# Patient Record
Sex: MALE | Race: WHITE | NOT HISPANIC OR LATINO | Employment: OTHER | RURAL
[De-identification: names, ages, dates, MRNs, and addresses within clinical notes are randomized per-mention and may not be internally consistent; named-entity substitution may affect disease eponyms.]

---

## 2017-02-21 ENCOUNTER — OFFICE VISIT (OUTPATIENT)
Dept: RETAIL CLINIC | Facility: CLINIC | Age: 52
End: 2017-02-21

## 2017-02-21 VITALS
OXYGEN SATURATION: 96 % | WEIGHT: 209.4 LBS | RESPIRATION RATE: 18 BRPM | HEIGHT: 68 IN | HEART RATE: 86 BPM | BODY MASS INDEX: 31.74 KG/M2 | TEMPERATURE: 99 F

## 2017-02-21 DIAGNOSIS — R06.2 WHEEZE: Primary | ICD-10-CM

## 2017-02-21 PROCEDURE — 99213 OFFICE O/P EST LOW 20 MIN: CPT | Performed by: NURSE PRACTITIONER

## 2017-02-21 RX ORDER — NAPROXEN SODIUM 220 MG
220 TABLET ORAL 2 TIMES DAILY PRN
COMMUNITY

## 2017-02-21 RX ORDER — METHYLPREDNISOLONE ACETATE 80 MG/ML
80 INJECTION, SUSPENSION INTRA-ARTICULAR; INTRALESIONAL; INTRAMUSCULAR; SOFT TISSUE ONCE
Status: COMPLETED | OUTPATIENT
Start: 2017-02-21 | End: 2017-02-21

## 2017-02-21 RX ORDER — ALBUTEROL SULFATE 90 UG/1
2 AEROSOL, METERED RESPIRATORY (INHALATION) EVERY 4 HOURS PRN
Qty: 1 INHALER | Refills: 0 | Status: SHIPPED | OUTPATIENT
Start: 2017-02-21

## 2017-02-21 RX ADMIN — METHYLPREDNISOLONE ACETATE 80 MG: 80 INJECTION, SUSPENSION INTRA-ARTICULAR; INTRALESIONAL; INTRAMUSCULAR; SOFT TISSUE at 09:18

## 2017-02-21 NOTE — PROGRESS NOTES
"Stefan Bush is a 51 y.o. male.   Visit Vitals   • Pulse 86   • Temp 99 °F (37.2 °C) (Oral)   • Resp 18   • Ht 68\" (172.7 cm)   • Wt 209 lb 6.4 oz (95 kg)   • SpO2 96%   • BMI 31.84 kg/m2     Pt states that he has had flu like symptoms last week with fever of 102, body aches, cough, nasal congestion and general fatigue. States he is left with a lingering cough, SOB and wheeze. He is a  and has been off work since last week.     Cough   This is a new problem. The current episode started 1 to 4 weeks ago. The problem has been gradually worsening. The problem occurs every few minutes. Associated symptoms include a fever, myalgias (resolved), rhinorrhea and wheezing. Sore throat: resolved.        The following portions of the patient's history were reviewed and updated as appropriate: allergies, current medications, past family history, past medical history, past social history, past surgical history and problem list.    Review of Systems   Constitutional: Positive for diaphoresis, fatigue (resolved) and fever.   HENT: Positive for congestion and rhinorrhea. Sore throat: resolved.    Respiratory: Positive for cough, chest tightness and wheezing.    Cardiovascular: Negative for palpitations and leg swelling.   Gastrointestinal: Negative for diarrhea, nausea and vomiting.   Musculoskeletal: Positive for arthralgias (resolved) and myalgias (resolved).       Objective   Physical Exam   Constitutional: He appears well-developed and well-nourished. He has a sickly appearance.   HENT:   Head: Normocephalic and atraumatic.   Right Ear: Tympanic membrane and ear canal normal.   Left Ear: Tympanic membrane and ear canal normal.   Nose: Mucosal edema and rhinorrhea present. Right sinus exhibits no maxillary sinus tenderness and no frontal sinus tenderness. Left sinus exhibits no maxillary sinus tenderness and no frontal sinus tenderness.   Mouth/Throat: Posterior oropharyngeal erythema (slight) " present.   Cardiovascular: Regular rhythm and normal heart sounds.    Pulmonary/Chest: Effort normal. He has wheezes (moderate to severe). He has rhonchi. He has rales (trace).   Lymphadenopathy:     He has no cervical adenopathy.   Skin: Skin is warm and dry.       Assessment/Plan   Isai was seen today for cough.    Diagnoses and all orders for this visit:    Wheeze  -     methylPREDNISolone acetate (DEPO-medrol) injection 80 mg; Inject 1 mL into the shoulder, thigh, or buttocks 1 (One) Time.    Other orders  -     albuterol (PROVENTIL HFA;VENTOLIN HFA) 108 (90 BASE) MCG/ACT inhaler; Inhale 2 puffs Every 4 (Four) Hours As Needed for wheezing or shortness of air.      Pt has been recommend that you he does require a chest x-ray to rule out pneumonia due to influenza.  He should go to his [facility] care physician today or no later than tomorrow or ER if necessary if symptoms worsen. Pt states understanding.

## 2022-09-21 RX ORDER — LOSARTAN POTASSIUM AND HYDROCHLOROTHIAZIDE 25; 100 MG/1; MG/1
TABLET ORAL
Qty: 30 TABLET | Refills: 0 | Status: SHIPPED | OUTPATIENT
Start: 2022-09-21 | End: 2022-10-18

## 2022-10-18 RX ORDER — LOSARTAN POTASSIUM AND HYDROCHLOROTHIAZIDE 25; 100 MG/1; MG/1
TABLET ORAL
Qty: 30 TABLET | Refills: 0 | Status: SHIPPED | OUTPATIENT
Start: 2022-10-18 | End: 2022-11-15

## 2022-11-15 RX ORDER — LOSARTAN POTASSIUM AND HYDROCHLOROTHIAZIDE 25; 100 MG/1; MG/1
TABLET ORAL
Qty: 30 TABLET | Refills: 0 | Status: SHIPPED | OUTPATIENT
Start: 2022-11-15 | End: 2022-12-20 | Stop reason: SDUPTHER

## 2022-12-20 RX ORDER — LOSARTAN POTASSIUM AND HYDROCHLOROTHIAZIDE 25; 100 MG/1; MG/1
TABLET ORAL
Qty: 30 TABLET | Refills: 0 | OUTPATIENT
Start: 2022-12-20

## 2022-12-20 RX ORDER — LOSARTAN POTASSIUM AND HYDROCHLOROTHIAZIDE 25; 100 MG/1; MG/1
1 TABLET ORAL DAILY
Qty: 30 TABLET | Refills: 0 | Status: SHIPPED | OUTPATIENT
Start: 2022-12-20 | End: 2023-02-20

## 2022-12-20 NOTE — TELEPHONE ENCOUNTER
Fine to refill that medicine for him for 1 month, but he would need to be called to set up an appointment to get any further refills.  Thanks.

## 2022-12-20 NOTE — TELEPHONE ENCOUNTER
Called and spoke with the patient and advised him that I would refill for 1 month and he stated he would call and schedule an appointment      rx sent

## 2023-02-06 DIAGNOSIS — R79.89 LOW TESTOSTERONE IN MALE: Primary | ICD-10-CM

## 2023-02-07 RX ORDER — TESTOSTERONE 16.2 MG/G
GEL TRANSDERMAL
Qty: 150 G | Refills: 0 | Status: SHIPPED | OUTPATIENT
Start: 2023-02-07

## 2023-02-17 ENCOUNTER — OFFICE VISIT (OUTPATIENT)
Dept: FAMILY MEDICINE CLINIC | Facility: CLINIC | Age: 58
End: 2023-02-17
Payer: COMMERCIAL

## 2023-02-17 VITALS
SYSTOLIC BLOOD PRESSURE: 118 MMHG | HEART RATE: 84 BPM | BODY MASS INDEX: 35.96 KG/M2 | HEIGHT: 68 IN | DIASTOLIC BLOOD PRESSURE: 70 MMHG | TEMPERATURE: 98.6 F | WEIGHT: 237.25 LBS | OXYGEN SATURATION: 98 % | RESPIRATION RATE: 14 BRPM

## 2023-02-17 DIAGNOSIS — J30.1 SEASONAL ALLERGIC RHINITIS DUE TO POLLEN: ICD-10-CM

## 2023-02-17 DIAGNOSIS — Z00.00 ROUTINE GENERAL MEDICAL EXAMINATION AT A HEALTH CARE FACILITY: ICD-10-CM

## 2023-02-17 DIAGNOSIS — E66.09 CLASS 2 OBESITY DUE TO EXCESS CALORIES WITHOUT SERIOUS COMORBIDITY WITH BODY MASS INDEX (BMI) OF 36.0 TO 36.9 IN ADULT: ICD-10-CM

## 2023-02-17 DIAGNOSIS — E78.2 MIXED HYPERLIPIDEMIA: ICD-10-CM

## 2023-02-17 DIAGNOSIS — Z23 NEED FOR VACCINATION: ICD-10-CM

## 2023-02-17 DIAGNOSIS — N52.9 VASCULOGENIC ERECTILE DYSFUNCTION, UNSPECIFIED VASCULOGENIC ERECTILE DYSFUNCTION TYPE: ICD-10-CM

## 2023-02-17 DIAGNOSIS — Z12.5 SPECIAL SCREENING, PROSTATE CANCER: ICD-10-CM

## 2023-02-17 DIAGNOSIS — R73.03 PREDIABETES: ICD-10-CM

## 2023-02-17 DIAGNOSIS — F17.210 CIGARETTE SMOKER: ICD-10-CM

## 2023-02-17 DIAGNOSIS — R79.89 LOW TESTOSTERONE IN MALE: ICD-10-CM

## 2023-02-17 DIAGNOSIS — I10 ESSENTIAL HYPERTENSION: Primary | ICD-10-CM

## 2023-02-17 DIAGNOSIS — R80.8 OTHER PROTEINURIA: ICD-10-CM

## 2023-02-17 PROBLEM — E66.812 CLASS 2 OBESITY DUE TO EXCESS CALORIES WITHOUT SERIOUS COMORBIDITY WITH BODY MASS INDEX (BMI) OF 36.0 TO 36.9 IN ADULT: Status: ACTIVE | Noted: 2023-02-17

## 2023-02-17 PROCEDURE — 90471 IMMUNIZATION ADMIN: CPT | Performed by: INTERNAL MEDICINE

## 2023-02-17 PROCEDURE — 99214 OFFICE O/P EST MOD 30 MIN: CPT | Performed by: INTERNAL MEDICINE

## 2023-02-17 PROCEDURE — 90715 TDAP VACCINE 7 YRS/> IM: CPT | Performed by: INTERNAL MEDICINE

## 2023-02-17 NOTE — ASSESSMENT & PLAN NOTE
Hemoglobin A1c upper limit of normal at 5.7% on 6/17/2022, with recheck pending with blood work today.  Reinforced importance of healthy diet, exercise and weight loss.

## 2023-02-17 NOTE — ASSESSMENT & PLAN NOTE
BMI in the mid 30s range, recommend healthy diet, exercise and benefits of even modest weight loss for multiple associated comorbidities

## 2023-02-17 NOTE — ASSESSMENT & PLAN NOTE
Diagnosis 3/9/2021.  EKG  3/9/2021 with sinus rhythm, with isolated and non-concerning right bundle branch block.  Continue losartan/HCTZ 100/25 mg, increase dosing 3/11/2022.  Monitor blood pressure regularly.  Continue healthy diet, exercise, weight loss, decrease salt intake, et cetera.  Advise concerns.

## 2023-02-17 NOTE — ASSESSMENT & PLAN NOTE
Felt in part related to low testosterone level as discussed initially 3/11/2022.  Clinically improved with replacement, if recurrent or persistent in the future we could consider adding sildenafil or comparable medication.

## 2023-02-17 NOTE — ASSESSMENT & PLAN NOTE
Last screening blood work 7/23/2021, fasting blood work ordered today 2/17/2023.  Tdap booster given 2/17/2023, patient declines pneumococcal 20 valent vaccine, despite discussion of benefits colonoscopy 4/14/2021 by Dr. De Leon including multiple small diverticula, internal hemorrhoids noted.  Multiple small hyperplastic appearing polyps in the rectum, in the transverse colon base less than 5 mm sessile polyp, transverse colon a 5 mm sessile polyp with no high-grade dysplasia on biopsy.  Five-year followup recommended.
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
## 2023-02-17 NOTE — ASSESSMENT & PLAN NOTE
Most recent total testosterone 6/25/2022 surprisingly slightly increased to 229, comparison 3/26/2022 243, and 3/19/2022 at 248.  At that time we increased his testosterone/AndroGel 1.62% from 2 pounds that each yielding 20.25 mg of testosterone from 2 pumps on the shoulder once daily up to 4 pumps on the shoulder, and since that time he has done well and feels his symptoms of functionally resolved and improved.  Additional testing on 6/25/2022 related to testosterone administration includes CBC with differential within normal limits, CMP with notable normal liver function test, although creatinine was a little bit increased 1.5, compared to normal previous 1.3.  Also, PSA normal 0.30 with comparison 0.2 on 3/19/2022.  These are reassuring for monitoring.  Recheck total testosterone and PSA at this time in addition to other screening blood work with management per results.

## 2023-02-17 NOTE — ASSESSMENT & PLAN NOTE
1+ proteinuria with 7/23/2021, blood work, cleared on recheck urinalysis today in clinic 3/11/2022.

## 2023-02-17 NOTE — ASSESSMENT & PLAN NOTE
7/23/2021 total cholesterol 198, triglycerides 226, HDL 28, .  Diet controlled.  Modest dyslipidemia pattern, reinforced importance of healthy diet, exercise and benefits of weight loss.

## 2023-02-17 NOTE — ASSESSMENT & PLAN NOTE
Good response to as needed use of typical over-the-counter antihistamine, sometimes nasal steroid.  Currently doing well.  Additional benefit of saline spray, nasal flushing.  Advise concerns.

## 2023-02-17 NOTE — PROGRESS NOTES
Venipuncture Blood Specimen Collection  Venipuncture performed in left arm by Carley Duncan MA with good hemostasis. Patient tolerated the procedure well without complications.   02/17/23   Carley Duncan MA

## 2023-02-17 NOTE — ASSESSMENT & PLAN NOTE
One pack per day for 30 years, currently one pack per day.  He is not ready to pursue cessation but he understands risks.  Advise desire to pursue cessation with medicine.  Low-dose CT of the chest ordered 2/17/2023.

## 2023-02-18 LAB
ALBUMIN SERPL-MCNC: 4.4 G/DL (ref 3.8–4.9)
ALBUMIN/GLOB SERPL: 1.6 {RATIO} (ref 1.2–2.2)
ALP SERPL-CCNC: 112 IU/L (ref 44–121)
ALT SERPL-CCNC: 15 IU/L (ref 0–44)
APPEARANCE UR: CLEAR
AST SERPL-CCNC: 17 IU/L (ref 0–40)
BACTERIA #/AREA URNS HPF: NORMAL /[HPF]
BASOPHILS # BLD AUTO: 0 X10E3/UL (ref 0–0.2)
BASOPHILS NFR BLD AUTO: 1 %
BILIRUB SERPL-MCNC: 0.6 MG/DL (ref 0–1.2)
BILIRUB UR QL STRIP: NEGATIVE
BUN SERPL-MCNC: 19 MG/DL (ref 6–24)
BUN/CREAT SERPL: 13 (ref 9–20)
CALCIUM SERPL-MCNC: 9.1 MG/DL (ref 8.7–10.2)
CASTS URNS QL MICRO: NORMAL /LPF
CHLORIDE SERPL-SCNC: 103 MMOL/L (ref 96–106)
CHOLEST SERPL-MCNC: 204 MG/DL (ref 100–199)
CO2 SERPL-SCNC: 19 MMOL/L (ref 20–29)
COLOR UR: YELLOW
CREAT SERPL-MCNC: 1.43 MG/DL (ref 0.76–1.27)
EGFRCR SERPLBLD CKD-EPI 2021: 57 ML/MIN/1.73
EOSINOPHIL # BLD AUTO: 0.2 X10E3/UL (ref 0–0.4)
EOSINOPHIL NFR BLD AUTO: 3 %
EPI CELLS #/AREA URNS HPF: NORMAL /HPF (ref 0–10)
ERYTHROCYTE [DISTWIDTH] IN BLOOD BY AUTOMATED COUNT: 13.2 % (ref 11.6–15.4)
GLOBULIN SER CALC-MCNC: 2.8 G/DL (ref 1.5–4.5)
GLUCOSE SERPL-MCNC: 107 MG/DL (ref 70–99)
GLUCOSE UR QL STRIP: NEGATIVE
HBA1C MFR BLD: 5.9 % (ref 4.8–5.6)
HCT VFR BLD AUTO: 50.6 % (ref 37.5–51)
HCV IGG SERPL QL IA: NON REACTIVE
HDLC SERPL-MCNC: 30 MG/DL
HGB BLD-MCNC: 16.6 G/DL (ref 13–17.7)
HGB UR QL STRIP: NEGATIVE
HIV 1+2 AB+HIV1 P24 AG SERPL QL IA: NON REACTIVE
IMM GRANULOCYTES # BLD AUTO: 0 X10E3/UL (ref 0–0.1)
IMM GRANULOCYTES NFR BLD AUTO: 0 %
KETONES UR QL STRIP: NEGATIVE
LDLC SERPL CALC-MCNC: 123 MG/DL (ref 0–99)
LEUKOCYTE ESTERASE UR QL STRIP: NEGATIVE
LYMPHOCYTES # BLD AUTO: 2.4 X10E3/UL (ref 0.7–3.1)
LYMPHOCYTES NFR BLD AUTO: 37 %
MCH RBC QN AUTO: 29.3 PG (ref 26.6–33)
MCHC RBC AUTO-ENTMCNC: 32.8 G/DL (ref 31.5–35.7)
MCV RBC AUTO: 89 FL (ref 79–97)
MICRO URNS: NORMAL
MICRO URNS: NORMAL
MONOCYTES # BLD AUTO: 0.7 X10E3/UL (ref 0.1–0.9)
MONOCYTES NFR BLD AUTO: 10 %
NEUTROPHILS # BLD AUTO: 3.2 X10E3/UL (ref 1.4–7)
NEUTROPHILS NFR BLD AUTO: 49 %
NITRITE UR QL STRIP: NEGATIVE
PH UR STRIP: 6 [PH] (ref 5–7.5)
PLATELET # BLD AUTO: 272 X10E3/UL (ref 150–450)
POTASSIUM SERPL-SCNC: 4.2 MMOL/L (ref 3.5–5.2)
PROT SERPL-MCNC: 7.2 G/DL (ref 6–8.5)
PROT UR QL STRIP: NEGATIVE
PSA SERPL-MCNC: 0.4 NG/ML (ref 0–4)
RBC # BLD AUTO: 5.66 X10E6/UL (ref 4.14–5.8)
RBC #/AREA URNS HPF: NORMAL /HPF (ref 0–2)
SODIUM SERPL-SCNC: 139 MMOL/L (ref 134–144)
SP GR UR STRIP: 1.02 (ref 1–1.03)
TESTOST SERPL-MCNC: 538 NG/DL (ref 264–916)
TRIGL SERPL-MCNC: 285 MG/DL (ref 0–149)
TSH SERPL DL<=0.005 MIU/L-ACNC: 2.64 UIU/ML (ref 0.45–4.5)
URINALYSIS REFLEX: NORMAL
UROBILINOGEN UR STRIP-MCNC: 0.2 MG/DL (ref 0.2–1)
VLDLC SERPL CALC-MCNC: 51 MG/DL (ref 5–40)
WBC # BLD AUTO: 6.5 X10E3/UL (ref 3.4–10.8)
WBC #/AREA URNS HPF: NORMAL /HPF (ref 0–5)

## 2023-02-20 RX ORDER — LOSARTAN POTASSIUM AND HYDROCHLOROTHIAZIDE 25; 100 MG/1; MG/1
TABLET ORAL
Qty: 30 TABLET | Refills: 6 | Status: SHIPPED | OUTPATIENT
Start: 2023-02-20

## 2023-02-20 NOTE — TELEPHONE ENCOUNTER
Caller: BIGG  GIRLFRIENJC    Best call back number   [phone]    Requested Prescriptions:   Requested Prescriptions     Pending Prescriptions Disp Refills   • losartan-hydrochlorothiazide (HYZAAR) 100-25 MG per tablet [Pharmacy Med Name: LOSARTAN-HCTZ 100-25 MG TAB] 30 tablet 0     Sig: TAKE ONE TABLET BY MOUTH ONCE A DAY        Pharmacy where request should be sent: [facility] [address] - [phone]  - [phone] FX     Additional details provided by patient:     Does the patient have less than a 3 day supply:  [x] Yes  [] No    Would you like a call back once the refill request has been completed: [] Yes [x] No    If the office needs to give you a call back, can they leave a voicemail: [] Yes [x] No

## 2023-02-21 ENCOUNTER — TELEPHONE (OUTPATIENT)
Dept: FAMILY MEDICINE CLINIC | Facility: CLINIC | Age: 58
End: 2023-02-21
Payer: COMMERCIAL

## 2023-02-21 NOTE — TELEPHONE ENCOUNTER
Blood work as obtained 2/17/2023.  Notable results as follows:    Urinalysis negative and nonconcerning with negative microscopy.  CBC with differential with normal blood counts.  CMP with mild elevation of glucose of 107, kidney function with creatinine slight elevation of 1.43, GFR slightly decreased at 57.  Comparison creatinine 1.5 with GFR 51 on 6/25/2022, 1.3 with GFR 61 on 7/23/2021.  Otherwise normal electrolytes, normal proteins, normal liver function test.  Total cholesterol 204, triglycerides 285, HDL 30, .  Comparison 7/23/2021 with total cholesterol 198, triglycerides 226, HDL 28, .  TSH normal at 2.640.  HIV and hepatitis C virus both negative.  Hemoglobin A1c as diabetic screen into prediabetic range at 5.9%, new diagnosis.  PSA normal at 0.4, comparison 0.3 on 6/25/2022,  0.23 on 7/23/2021.  Total testosterone improved to 538 with normal range 264-916.  Comparison 6/25/2022 at 229, 3/26/2022 at 243 on 3/19/2022 at 248.    Related to low testosterone he appears to be responding well to current dosing of testosterone gel, continue unchanged.    Related to creatinine elevation, slightly improved compared to prior, reinforced avoidance of renal toxic medication, monitor.  In context of prediabetic pattern, low threshold to consider adding SGLT2 inhibitor in future.      Related to hyperlipidemia pattern, fairly similar to previous with slightly increased triglycerides.  At this time reinforced importance of healthy diet, exercise, although again with prediabetic pattern, lower threshold if this worsens in the future to add statin therapy.    Related to prediabetic pattern, reinforced importance of healthy diet, exercise, and benefits of even modest weight loss.  Plan to recheck hemoglobin A1c at follow-up visit 6 months.

## 2023-03-03 ENCOUNTER — TELEPHONE (OUTPATIENT)
Dept: FAMILY MEDICINE CLINIC | Facility: CLINIC | Age: 58
End: 2023-03-03
Payer: COMMERCIAL

## 2023-03-03 DIAGNOSIS — F17.210 CIGARETTE SMOKER: ICD-10-CM

## 2023-03-03 NOTE — TELEPHONE ENCOUNTER
----- Message from Ulises Maldonado MD sent at 3/3/2023 10:15 AM EST -----  Please advise patient of normal and negative low-dose CT scan of the chest as obtained 3/3/2023 as lung cancer screening.  Repeat yearly as per standard recommendations.

## 2023-10-06 ENCOUNTER — OFFICE VISIT (OUTPATIENT)
Dept: FAMILY MEDICINE CLINIC | Facility: CLINIC | Age: 58
End: 2023-10-06

## 2023-10-06 VITALS
DIASTOLIC BLOOD PRESSURE: 78 MMHG | BODY MASS INDEX: 35.86 KG/M2 | SYSTOLIC BLOOD PRESSURE: 124 MMHG | RESPIRATION RATE: 18 BRPM | OXYGEN SATURATION: 96 % | TEMPERATURE: 97.6 F | WEIGHT: 236.6 LBS | HEART RATE: 71 BPM | HEIGHT: 68 IN

## 2023-10-06 DIAGNOSIS — E66.09 CLASS 2 OBESITY DUE TO EXCESS CALORIES WITHOUT SERIOUS COMORBIDITY WITH BODY MASS INDEX (BMI) OF 36.0 TO 36.9 IN ADULT: ICD-10-CM

## 2023-10-06 DIAGNOSIS — F17.210 CIGARETTE SMOKER: ICD-10-CM

## 2023-10-06 DIAGNOSIS — I10 ESSENTIAL HYPERTENSION: Primary | ICD-10-CM

## 2023-10-06 DIAGNOSIS — R79.89 LOW TESTOSTERONE IN MALE: ICD-10-CM

## 2023-10-06 DIAGNOSIS — R73.03 PREDIABETES: ICD-10-CM

## 2023-10-06 LAB
EXPIRATION DATE: NORMAL
HBA1C MFR BLD: 5.9 %
Lab: NORMAL

## 2023-10-06 RX ORDER — LOSARTAN POTASSIUM AND HYDROCHLOROTHIAZIDE 25; 100 MG/1; MG/1
1 TABLET ORAL DAILY
Qty: 90 TABLET | Refills: 1 | Status: SHIPPED | OUTPATIENT
Start: 2023-10-06

## 2023-10-06 RX ORDER — TESTOSTERONE 16.2 MG/G
0.08 GEL TRANSDERMAL DAILY
Qty: 150 G | Refills: 1 | Status: SHIPPED | OUTPATIENT
Start: 2023-10-06

## 2023-10-06 NOTE — ASSESSMENT & PLAN NOTE
Hemoglobin A1c stable at 5.9% with previous 5.9% on 2/17/2023, prior to that at diagnosis 5.7% on 6/17/2022.  Modest pattern of increase but stable today, reinforced importance of healthy diet, exercise and weight loss.

## 2023-10-06 NOTE — ASSESSMENT & PLAN NOTE
Diagnosis 3/9/2021. EKG 3/9/2021 with sinus rhythm, with isolated and non-concerning right bundle branch block. Continue losartan/HCTZ 100/25 mg, increased from previous dose of 50/12.5 on 3/11/2022.  Good results in clinic but he needs to start monitoring blood pressure more regularly at home.  Continue healthy diet, exercise, weight loss, decrease salt intake, et cetera. Advise concerns.

## 2023-10-06 NOTE — ASSESSMENT & PLAN NOTE
Most recent total Testolin 538 on 2/17/2023 after increasing from 2 pumps daily up to 4 pumps daily of the AndroGel 1.60%.  Previous 6/25/2022 surprisingly slightly increased to 229, comparison 3/26/2022 243, and 3/19/2022 at 248.  Of note, previous testing on 6/25/2022 related to testosterone administration includes CBC with differential within normal limits, CMP with notable normal liver function test, although creatinine was a little bit increased 1.5, compared to normal previous 1.3.  Also, PSA normal 0.30 with comparison 0.2 on 3/19/2022.  These are reassuring for monitoring.  Plan was to recheck total testosterone, CBC and CMP today although patient does not have insurance, and has set not been taking his testosterone regularly, as such rechecking now would not make sense.  As such, resume at the previous dosing of 4 pumps per day of AndroGel 1.62%, and plan to check the blood counts in the next 2 months once in his system regularly.

## 2023-10-06 NOTE — ASSESSMENT & PLAN NOTE
One pack per day for 30 years, currently one pack per day.  He is not ready to pursue cessation but he understands risks.  Advise desire to pursue cessation with medicine.  Low-dose CT of the chest negative on 3/3/2023, repeat yearly.

## 2023-10-06 NOTE — ASSESSMENT & PLAN NOTE
BMI in the mid 30s range, reinforced importance of healthy diet, exercise and weight loss for multiple medical comorbidities.   Benzoyl Peroxide Counseling: Patient counseled that medicine may cause skin irritation and bleach clothing.  In the event of skin irritation, the patient was advised to reduce the amount of the drug applied or use it less frequently.   The patient verbalized understanding of the proper use and possible adverse effects of benzoyl peroxide.  All of the patient's questions and concerns were addressed.
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
## 2024-04-15 DIAGNOSIS — I10 ESSENTIAL HYPERTENSION: ICD-10-CM

## 2024-04-15 RX ORDER — LOSARTAN POTASSIUM AND HYDROCHLOROTHIAZIDE 25; 100 MG/1; MG/1
1 TABLET ORAL DAILY
Qty: 90 TABLET | Refills: 1 | Status: SHIPPED | OUTPATIENT
Start: 2024-04-15

## 2024-04-16 DIAGNOSIS — R79.89 LOW TESTOSTERONE IN MALE: ICD-10-CM

## 2024-04-16 RX ORDER — TESTOSTERONE 16.2 MG/G
0.08 GEL TRANSDERMAL DAILY
Qty: 150 G | Refills: 1 | Status: SHIPPED | OUTPATIENT
Start: 2024-04-16

## 2024-05-17 ENCOUNTER — OFFICE VISIT (OUTPATIENT)
Dept: FAMILY MEDICINE CLINIC | Facility: CLINIC | Age: 59
End: 2024-05-17
Payer: MEDICAID

## 2024-05-17 VITALS
HEART RATE: 80 BPM | TEMPERATURE: 97.7 F | SYSTOLIC BLOOD PRESSURE: 126 MMHG | HEIGHT: 68 IN | RESPIRATION RATE: 18 BRPM | OXYGEN SATURATION: 97 % | WEIGHT: 235.8 LBS | DIASTOLIC BLOOD PRESSURE: 78 MMHG | BODY MASS INDEX: 35.74 KG/M2

## 2024-05-17 DIAGNOSIS — F17.210 CIGARETTE SMOKER: ICD-10-CM

## 2024-05-17 DIAGNOSIS — I10 ESSENTIAL HYPERTENSION: ICD-10-CM

## 2024-05-17 DIAGNOSIS — Z00.00 ROUTINE GENERAL MEDICAL EXAMINATION AT A HEALTH CARE FACILITY: Primary | ICD-10-CM

## 2024-05-17 DIAGNOSIS — R79.89 LOW TESTOSTERONE IN MALE: ICD-10-CM

## 2024-05-17 DIAGNOSIS — R80.8 OTHER PROTEINURIA: ICD-10-CM

## 2024-05-17 DIAGNOSIS — N52.9 VASCULOGENIC ERECTILE DYSFUNCTION, UNSPECIFIED VASCULOGENIC ERECTILE DYSFUNCTION TYPE: ICD-10-CM

## 2024-05-17 DIAGNOSIS — R00.2 PALPITATIONS: ICD-10-CM

## 2024-05-17 DIAGNOSIS — Z12.5 SPECIAL SCREENING, PROSTATE CANCER: ICD-10-CM

## 2024-05-17 DIAGNOSIS — E66.09 CLASS 2 OBESITY DUE TO EXCESS CALORIES WITHOUT SERIOUS COMORBIDITY WITH BODY MASS INDEX (BMI) OF 36.0 TO 36.9 IN ADULT: ICD-10-CM

## 2024-05-17 DIAGNOSIS — Z23 NEED FOR VACCINATION: ICD-10-CM

## 2024-05-17 DIAGNOSIS — J30.1 SEASONAL ALLERGIC RHINITIS DUE TO POLLEN: ICD-10-CM

## 2024-05-17 DIAGNOSIS — E78.2 MIXED HYPERLIPIDEMIA: ICD-10-CM

## 2024-05-17 DIAGNOSIS — G47.10 HYPERSOMNIA: ICD-10-CM

## 2024-05-17 DIAGNOSIS — R73.03 PREDIABETES: ICD-10-CM

## 2024-05-17 DIAGNOSIS — M54.2 CERVICALGIA: ICD-10-CM

## 2024-05-17 LAB
EXPIRATION DATE: NORMAL
EXPIRATION DATE: NORMAL
GLUCOSE BLDC GLUCOMTR-MCNC: 116 MG/DL (ref 70–130)
HBA1C MFR BLD: 5.5 % (ref 4.5–5.7)
Lab: NORMAL
Lab: NORMAL

## 2024-05-17 RX ORDER — CETIRIZINE HYDROCHLORIDE 10 MG/1
10 TABLET ORAL DAILY
Qty: 30 TABLET | Refills: 3 | Status: SHIPPED | OUTPATIENT
Start: 2024-05-17

## 2024-05-17 RX ORDER — FLUTICASONE PROPIONATE 50 MCG
2 SPRAY, SUSPENSION (ML) NASAL DAILY
Qty: 15.8 ML | Refills: 3 | Status: SHIPPED | OUTPATIENT
Start: 2024-05-17

## 2024-05-17 RX ORDER — NAPROXEN 375 MG/1
375 TABLET ORAL 2 TIMES DAILY PRN
Qty: 60 TABLET | Refills: 1 | Status: SHIPPED | OUTPATIENT
Start: 2024-05-17

## 2024-05-17 NOTE — ASSESSMENT & PLAN NOTE
1 pack/day smoking.  Historically One pack per day for 30 years,.  He is not ready to pursue cessation but he understands risks.  Advise desire to pursue cessation with medicine.  Low-dose CT of the chest negative on 3/3/2023, repeat ordered 5/17/2024, management results.

## 2024-05-17 NOTE — ASSESSMENT & PLAN NOTE
Rare palpitation, no shortness of breath.  EKG reassuring with normal sinus rhythm right bundle branch block on EKG 5/17/2024 and unchanged to 2021 EKG.  No intervention necessary, continue monitoring and treatment of blood pressure.

## 2024-05-17 NOTE — ASSESSMENT & PLAN NOTE
Last screening blood work 2/17/2023 including negative HIV and hepatitis C virus screening.  Tdap booster given 2/17/2023, consider pneumococcal 20 valent vaccine but unclear if insurance will cover.  Obtain through pharmacy.  Colonoscopy 4/14/2021 by Dr. De Leon including multiple small diverticula, internal hemorrhoids noted.  Multiple small hyperplastic appearing polyps in the rectum, in the transverse colon base less than 5 mm sessile polyp, transverse colon a 5 mm sessile polyp with no high-grade dysplasia on biopsy.  Five-year followup recommended.

## 2024-05-17 NOTE — ASSESSMENT & PLAN NOTE
1+ proteinuria with 7/23/2021, blood work, cleared on recheck urinalysis in clinic 3/11/2022.  Monitor with blood work yearly, pending 5/17/2024.

## 2024-05-17 NOTE — ASSESSMENT & PLAN NOTE
Hemoglobin A1c improved to 5.5% compared to previous 5.9%, 5.9% on 2/17/2023, prior to that at diagnosis 5.7% on 6/17/2022.  Modest pattern of increase but stable today, reinforced importance of healthy diet, exercise and weight loss.  Caution polyuria and polydipsia sign of progression of diabetes.  Monitor every 6 months at minimum.

## 2024-05-17 NOTE — ASSESSMENT & PLAN NOTE
Plan pneumococcal 20 valent vaccine but unsure if covered by insurance, he can have obtain through health department or pharmacy or if we can get clarity that will be covered at follow-up visit we can administer them.

## 2024-05-17 NOTE — PROGRESS NOTES
"    Male Physical Note      Date: 2024   Patient Name: Isai Bush  : 1965   MRN: [id number]     Chief Complaint   Patient presents with    Annual Exam       History of Present Illness: Isai Bush is a 58 y.o. male who is here today for their annual health maintenance and physical.  In addition discussion multiple other medical problems.  Blood pressure checked infrequently home but he does not see any \"good range in.  No lightheadedness or dizziness.  Regarding prediabetes, obesity, hyperlipidemia, he has been doing a bit better with diet and activity and his hemoglobin A1c is improved today.  Some sense of some sleep difficulties manifested by snoring and sometimes breath-holding and, sometimes being fatigued in the day although somewhat variable in that regard.  We discussed consideration of sleep apnea and he would be interested in pursuing such investigations.  He is also had a bit of a flare of symptoms acute on chronic lower neck pain on the left greater than right, on and off for the last months compared to last couple years prior.  No shooting pain down the arms or legs, no weakness.  Achiness to palpation, flaring sometimes in the nighttime.  Anti-inflammatories give benefit.  Regarding low testosterone has been back on his testosterone placement he does feel like his benefit, he feels better, the better energy and erectile difficulties from previous have improved.  He continues smoking 1 pack/day of cigarettes, not ready for cessation, understanding risk of ongoing smoking.  He had some flare of congestion drainage over the last weeks, with sneezing, no fevers or chills, good energy and appetite.  Rare palpitation without shortness of breath or chest tightness, very transient.  No other new concerns.    Subjective      Review of Systems    Past Medical History, Social History, Family History and Care Team were all reviewed with patient and updated as appropriate.       Current " Outpatient Medications:     losartan-hydrochlorothiazide (HYZAAR) 100-25 MG per tablet, Take 1 tablet by mouth Daily., Disp: 90 tablet, Rfl: 1    Testosterone (AndroGel Pump) 20.25 MG/ACT (1.62%) gel, Apply 0.081 g topically to the appropriate area as directed Daily. 4 pumps topically to shoulders and upper arms once daily, Disp: 150 g, Rfl: 1    cetirizine (zyrTEC) 10 MG tablet, Take 1 tablet by mouth Daily., Disp: 30 tablet, Rfl: 3    fluticasone (FLONASE) 50 MCG/ACT nasal spray, 2 sprays into the nostril(s) as directed by provider Daily., Disp: 15.8 mL, Rfl: 3    naproxen (NAPROSYN) 375 MG tablet, Take 1 tablet by mouth 2 (Two) Times a Day As Needed for Mild Pain., Disp: 60 tablet, Rfl: 1    No Known Allergies    Immunization History   Administered Date(s) Administered    COVID-19 (TRACE) 08/06/2021    COVID-19 (UNSPECIFIED) 08/06/2021    Fluzone (or Fluarix & Flulaval for VFC) >6mos 10/24/2020    Tdap 02/17/2023        Health Maintenance Summary            Overdue - Pneumococcal Vaccine 0-64 (1 of 2 - PCV) Never done      No completion, postpone, or frequency change history exists for this topic.              Overdue - ZOSTER VACCINE (1 of 2) Never done      No completion, postpone, or frequency change history exists for this topic.              Overdue - ANNUAL PHYSICAL (Yearly) Never done      No completion, postpone, or frequency change history exists for this topic.              Overdue - COVID-19 Vaccine (3 - 2023-24 season) Overdue since 9/1/2023 08/06/2021  Imm Admin: COVID-19 (UNSPECIFIED)    08/06/2021  Imm Admin: COVID-19 (TRACE)              Ordered - LIPID PANEL (Yearly) Ordered on 5/17/2024 02/17/2023  Lipid Panel              Overdue - BMI FOLLOWUP (Yearly) Overdue since 2/17/2024 02/17/2023  [employer]: WORKFLOW - QUALITY MEASUREMENT - BMI FOLLOW UP CARE PLAN DOCUMENTED    02/17/2023  [employer]: WORKFLOW - QUALITY MEASUREMENT - DOCUMENTED WEIGHT FOLLOW-UP PLAN               "Ordered - LUNG CANCER SCREENING (Yearly) Ordered on 2023   CT Chest Low Dose Cancer Screening WO    10/22/2020  CT CHEST W CONTRAST DIAGNOSTIC              INFLUENZA VACCINE (Yearly - August to March) Next due on 2023  Postponed until 3/31/2024 by Rozina Whitaker (Patient Refused)    10/24/2020  Imm Admin: FluLaval/Fluzone >6mos              COLORECTAL CANCER SCREENING (COLONOSCOPY - Every 10 Years) Next due on 2021  Colonoscopy              TDAP/TD VACCINES (2 - Td or Tdap) Next due on 2023  Imm Admin: Tdap              HEPATITIS C SCREENING  Completed      2023  Hep C Virus Ab component of Hepatitis C Antibody                    Colorectal Screenin2021 by Dr. De Leon with 5-year repeat  Last Completed Colonoscopy            COLORECTAL CANCER SCREENING (COLONOSCOPY - Every 10 Years) Next due on 2021  Colonoscopy                  CT for Smoker (Age 50-80, 20pk yr within last 15 years): Pending order 2024, - spring 2023  Hep C (Age 18-79 once): Negative on 2023  HIV (Age 15-65 once) : Negative on 2023  PSA (Over age 50, C Level Recommendation):   Lab Results   Component Value Date    PSA 0.4 2023     US Aorta (For male smokers, age 65): Not applicable  A1c:   Hemoglobin A1C   Date Value Ref Range Status   2024 5.5 4.5 - 5.7 % Final     Lipid panel: No results found for: \"LIPIDEXCLUSI\"    The 10-year ASCVD risk score (Alejandra RUBALCAVA, et al., 2019) is: 21.3%    Values used to calculate the score:      Age: 58 years      Sex: Male      Is Non- : No      Diabetic: No      Tobacco smoker: Yes      Systolic Blood Pressure: 126 mmHg      Is BP treated: Yes      HDL Cholesterol: 30 mg/dL      Total Cholesterol: 204 mg/dL      Tobacco Use: High Risk (2024)    Patient History     Smoking Tobacco Use: Every Day     Smokeless Tobacco Use: Never     Passive " "Exposure: Not on file       Social History     Substance and Sexual Activity   Alcohol Use Not Currently    Alcohol/week: 2.0 standard drinks of alcohol    Types: 2 Cans of beer per week    Comment: Rare social alcohol, once or twice monthly, typically beer        Social History     Substance and Sexual Activity   Drug Use Not Currently        Diet/Physical activity: Ongoing attempts improve dietary intake and activity level although difficulty maintaining    Sexual health:  Social History     Substance and Sexual Activity   Sexual Activity Yes       PHQ-2 Depression Screening  PHQ-9 Total Score: 0       Intimate partner violence: (Screen on initial visit, older adult with injury or evidence of neglect):  Violence can be a problem in many people's lives, so I now ask every patient about trauma or abuse they may have experienced in a relationship.  Stress/Safety - Do you feel safe in your relationship?  Afraid/Abused - Have you ever been in a relationship where you were threatened, hurt, or afraid?  Friend/Family - Are your friends aware you have been hurt?  Emergency Plan - Do you have a safe place to go and the resources you need in an emergency?    Osteoporosis:   Men: history of low trauma fracture, androgen deprivation therapy for prostate cancer, hypogonadism, primary hyperparathyroidism, intestinal disorders.     Objective     Physical Exam:  Vitals:    05/17/24 0928   BP: 126/78   BP Location: Left arm   Patient Position: Sitting   Cuff Size: Adult   Pulse: 80   Resp: 18   Temp: 97.7 °F (36.5 °C)   TempSrc: Temporal   SpO2: 97%   Weight: 107 kg (235 lb 12.8 oz)   Height: 171.5 cm (67.5\")     Body mass index is 36.39 kg/m².     Physical Exam  Constitutional:       General: He is not in acute distress.     Appearance: Normal appearance. He is not ill-appearing, toxic-appearing or diaphoretic.   HENT:      Head: Normocephalic and atraumatic.      Right Ear: Ear canal and external ear normal.      Left Ear: Ear " canal and external ear normal.      Ears:      Comments: Mild to moderate fluid behind the TMs bilaterally can otherwise clear     Nose: Rhinorrhea present.      Comments: Moderate clear rhinorrhea, pale mucosa     Mouth/Throat:      Mouth: Mucous membranes are moist.      Pharynx: Oropharynx is clear. No oropharyngeal exudate or posterior oropharyngeal erythema.      Comments: Chronic mucositis noted with smoking history, otherwise negative  Eyes:      Extraocular Movements: Extraocular movements intact.      Conjunctiva/sclera: Conjunctivae normal.      Pupils: Pupils are equal, round, and reactive to light.   Neck:      Vascular: No carotid bruit.      Comments: No thyroid enlargement  Cardiovascular:      Rate and Rhythm: Normal rate and regular rhythm.      Pulses: Normal pulses.      Heart sounds: Normal heart sounds. No murmur heard.     No friction rub. No gallop.   Pulmonary:      Effort: Pulmonary effort is normal. No respiratory distress.      Breath sounds: Normal breath sounds. No stridor. No wheezing.   Abdominal:      General: Abdomen is flat. Bowel sounds are normal. There is no distension.      Palpations: Abdomen is soft. There is no mass.      Tenderness: There is no abdominal tenderness. There is no guarding or rebound.      Hernia: No hernia is present.   Genitourinary:     Comments: Patient defers  exam  Musculoskeletal:         General: Tenderness present.      Cervical back: Neck supple. No tenderness.      Right lower leg: No edema.      Left lower leg: No edema.      Comments: Evaluation of the lower cervical paraspinal muscles of the neck reveals some tenderness to palpation, with minimal cervical spinous process tenderness at the lower levels, no thoracic or lumbar spinous process tenderness.  Preservation of strength sensation reflexes in upper extremities.   Lymphadenopathy:      Cervical: No cervical adenopathy.   Skin:     General: Skin is warm and dry.      Capillary Refill:  Capillary refill takes less than 2 seconds.   Neurological:      General: No focal deficit present.      Mental Status: He is alert and oriented to person, place, and time. Mental status is at baseline.   Psychiatric:         Mood and Affect: Mood normal.         Behavior: Behavior normal.         Thought Content: Thought content normal.           ECG 12 Lead    Date/Time: 5/17/2024 12:56 PM  Performed by: Ulises Maldonado MD    Authorized by: Ulises Maldonado MD  Comparison: compared with previous ECG   Rhythm: sinus rhythm  Rate: normal  Conduction: right bundle branch block  ST Segments: ST segments normal  T Waves: T waves normal  QRS axis: normal  Other: no other findings    Clinical impression: abnormal EKG  Comments: Abnormal EKG with right bundle branch block but stable and unchanged for many years, as such nonconcerning pattern with no acute process.          Assessment / Plan      Assessment/Plan:   Diagnoses and all orders for this visit:    1. Routine general medical examination at a health care facility (Primary)  Assessment & Plan:  Last screening blood work 2/17/2023 including negative HIV and hepatitis C virus screening.  Tdap booster given 2/17/2023, consider pneumococcal 20 valent vaccine but unclear if insurance will cover.  Obtain through pharmacy.  Colonoscopy 4/14/2021 by Dr. De Leon including multiple small diverticula, internal hemorrhoids noted.  Multiple small hyperplastic appearing polyps in the rectum, in the transverse colon base less than 5 mm sessile polyp, transverse colon a 5 mm sessile polyp with no high-grade dysplasia on biopsy.  Five-year followup recommended.     Orders:  -     CBC & Differential; Future  -     Comprehensive Metabolic Panel; Future  -     Urinalysis With Culture If Indicated - Urine, Clean Catch; Future  -     Lipid Panel; Future  -     TSH Rfx On Abnormal To Free T4; Future  -     TSH Rfx On Abnormal To Free T4  -     Lipid Panel  -     Urinalysis With Culture If  Indicated - Urine, Clean Catch  -     Comprehensive Metabolic Panel  -     CBC & Differential    2. Palpitations  Assessment & Plan:  Rare palpitation, no shortness of breath.  EKG reassuring with normal sinus rhythm right bundle branch block on EKG 5/17/2024 and unchanged to 2021 EKG.  No intervention necessary, continue monitoring and treatment of blood pressure.    Orders:  -     ECG 12 Lead    3. Essential hypertension  Assessment & Plan:  Diagnosis 3/9/2021. EKG 5/17/2024 with sinus rhythm, with isolated and non-concerning right bundle branch block, unchanged to 3/9/2021.  Continue good blood pressure control, continue losartan/HCTZ 100/25 mg, increased from previous dose of 50/12.5 on 3/11/2022.  Good results in clinic but he needs to start monitoring blood pressure more regularly at home.  Continue healthy diet, exercise, weight loss, decrease salt intake, et cetera. Advise concerns.       4. Mixed hyperlipidemia  Assessment & Plan:  2/17/2023 total cholesterol 204, triglycerides 295, HDL 30, .  Comparison 7/23/2021 total cholesterol 198, triglycerides 226, HDL 28, .  Diet controlled.  Modest dyslipidemia pattern, reinforced importance of healthy diet, exercise and benefits of weight loss.  Recheck of cholesterol pending with blood work 5/17/2024.      5. Cigarette smoker  Assessment & Plan:  1 pack/day smoking.  Historically One pack per day for 30 years,.  He is not ready to pursue cessation but he understands risks.  Advise desire to pursue cessation with medicine.  Low-dose CT of the chest negative on 3/3/2023, repeat ordered 5/17/2024, management results.     Orders:  -      CT Chest Low Dose Cancer Screening WO; Future    6. Class 2 obesity due to excess calories without serious comorbidity with body mass index (BMI) of 36.0 to 36.9 in adult  Assessment & Plan:  BMI in the mid 30s range, reinforced importance of healthy diet, exercise and weight loss for multiple medical comorbidities.        7. Prediabetes  Assessment & Plan:  Hemoglobin A1c improved to 5.5% compared to previous 5.9%, 5.9% on 2/17/2023, prior to that at diagnosis 5.7% on 6/17/2022.  Modest pattern of increase but stable today, reinforced importance of healthy diet, exercise and weight loss.  Caution polyuria and polydipsia sign of progression of diabetes.  Monitor every 6 months at minimum.    Orders:  -     POC Glycosylated Hemoglobin (Hb A1C)  -     POC Glucose, Blood    8. Low testosterone in male  Assessment & Plan:  Most recent total testosterone 538 on 2/17/2023 after increasing from 2 pumps daily up to 4 pumps daily of the AndroGel 1.60%.  Previous 6/25/2022 surprisingly slightly increased to 229, comparison 3/26/2022 243, and 3/19/2022 at 248.  Of note, previous testing on 6/25/2022 related to testosterone administration includes CBC with differential within normal limits, CMP with notable normal liver function test, although creatinine was a little bit increased 1.5, compared to normal previous 1.3.  Also, PSA normal 0.30 with comparison 0.2 on 3/19/2022.  These are reassuring for monitoring.  Transient loss of insurance affected ability to recheck as of October 2023 is now well past due and we will recheck a total testosterone level with CBC, CMP and PSA.  In general he should have monitoring every 6 months at least of total testosterone, and additionally yearly CBC and CMP with PSA now that he has stability.  Continue previously good range of dosing of 4 pumps per day of AndroGel 1.62%, with recheck of blood work as noted above.    Orders:  -     Testosterone; Future  -     Testosterone    9. Vasculogenic erectile dysfunction, unspecified vasculogenic erectile dysfunction type  Assessment & Plan:  Felt in part related to low testosterone level as discussed initially 3/11/2022. Clinically improved with replacement, if recurrent or persistent in the future we could consider adding sildenafil or comparable medication.        10. Other proteinuria  Assessment & Plan:  1+ proteinuria with 7/23/2021, blood work, cleared on recheck urinalysis in clinic 3/11/2022.  Monitor with blood work yearly, pending 5/17/2024.      11. Seasonal allergic rhinitis due to pollen  Assessment & Plan:  Some breakthrough congestion drainage over the last weeks, prescription provided for Zyrtec and Flonase to use for the next couple weeks, then as needed.  Additional benefit of saline spray, nasal flushing.  We could consider adding montelukast in the future for breakthrough symptoms.  Additional benefit of saline spray, nasal flushing.  Advised if not improving.    Orders:  -     cetirizine (zyrTEC) 10 MG tablet; Take 1 tablet by mouth Daily.  Dispense: 30 tablet; Refill: 3  -     fluticasone (FLONASE) 50 MCG/ACT nasal spray; 2 sprays into the nostril(s) as directed by provider Daily.  Dispense: 15.8 mL; Refill: 3    12. Special screening, prostate cancer  -     PSA Screen; Future  -     PSA Screen    13. Need for vaccination  Assessment & Plan:  Plan pneumococcal 20 valent vaccine but unsure if covered by insurance, he can have obtain through health department or pharmacy or if we can get clarity that will be covered at follow-up visit we can administer them.      14. Hypersomnia  Assessment & Plan:  Diagnosis 5/7/2024 with component of regular snoring, sometimes breath-holding and potentially variable pattern of daytime fatigue and sleepiness.  Refer for sleep evaluation to consider obstructive sleep apnea, which if diagnosed and treated could benefit multimedical problems.  I appreciate that input.    Orders:  -     Ambulatory Referral to Sleep Medicine    15. Cervicalgia  Assessment & Plan:  Involving the paraspinal muscles more so than cervical spinous process region and lower cervical levels, somewhat acute on chronic with worsening last months compared to last couple years.  No concerning neurologic manifestations.  Obtain x-ray of the cervical  spine with manage per results but if reassuring plan physical therapy to evaluate and treat.  Naproxen 375 mg twice daily as needed, using short burst.  Heating pad, light stretching.  Advise concerns.    Orders:  -     XR Spine Cervical 3 View; Future  -     naproxen (NAPROSYN) 375 MG tablet; Take 1 tablet by mouth 2 (Two) Times a Day As Needed for Mild Pain.  Dispense: 60 tablet; Refill: 1         Vaccine Counseling:      Healthcare Maintenance:  Counseling provided based on age appropriate USPSTF guidelines.  Class 2 Severe Obesity (BMI >=35 and <=39.9). Obesity-related health conditions include the following: hypertension and dyslipidemias. Obesity is unchanged. BMI is is above average; BMI management plan is completed. We discussed low calorie, low carb based diet program, portion control, increasing exercise, and joining a fitness center or start home based exercise program.      Smoking Cessation:   3-10 mintues spent counseling Will try to cut down    Isai Bush voices understanding and acceptance of this advice and will call back with any further questions or concerns. AVS with preventive healthcare tips printed for patient.     Follow Up:   Return in about 6 months (around 11/17/2024) for Next scheduled follow up.        Ulises Maldonado MD  [facility]

## 2024-05-17 NOTE — PATIENT INSTRUCTIONS

## 2024-05-17 NOTE — ASSESSMENT & PLAN NOTE
Some breakthrough congestion drainage over the last weeks, prescription provided for Zyrtec and Flonase to use for the next couple weeks, then as needed.  Additional benefit of saline spray, nasal flushing.  We could consider adding montelukast in the future for breakthrough symptoms.  Additional benefit of saline spray, nasal flushing.  Advised if not improving.

## 2024-05-17 NOTE — ASSESSMENT & PLAN NOTE
2/17/2023 total cholesterol 204, triglycerides 295, HDL 30, .  Comparison 7/23/2021 total cholesterol 198, triglycerides 226, HDL 28, .  Diet controlled.  Modest dyslipidemia pattern, reinforced importance of healthy diet, exercise and benefits of weight loss.  Recheck of cholesterol pending with blood work 5/17/2024.

## 2024-05-17 NOTE — ASSESSMENT & PLAN NOTE
Diagnosis 5/7/2024 with component of regular snoring, sometimes breath-holding and potentially variable pattern of daytime fatigue and sleepiness.  Refer for sleep evaluation to consider obstructive sleep apnea, which if diagnosed and treated could benefit multimedical problems.  I appreciate that input.

## 2024-05-17 NOTE — ASSESSMENT & PLAN NOTE
Most recent total testosterone 538 on 2/17/2023 after increasing from 2 pumps daily up to 4 pumps daily of the AndroGel 1.60%.  Previous 6/25/2022 surprisingly slightly increased to 229, comparison 3/26/2022 243, and 3/19/2022 at 248.  Of note, previous testing on 6/25/2022 related to testosterone administration includes CBC with differential within normal limits, CMP with notable normal liver function test, although creatinine was a little bit increased 1.5, compared to normal previous 1.3.  Also, PSA normal 0.30 with comparison 0.2 on 3/19/2022.  These are reassuring for monitoring.  Transient loss of insurance affected ability to recheck as of October 2023 is now well past due and we will recheck a total testosterone level with CBC, CMP and PSA.  In general he should have monitoring every 6 months at least of total testosterone, and additionally yearly CBC and CMP with PSA now that he has stability.  Continue previously good range of dosing of 4 pumps per day of AndroGel 1.62%, with recheck of blood work as noted above.

## 2024-05-17 NOTE — ASSESSMENT & PLAN NOTE
BMI in the mid 30s range, reinforced importance of healthy diet, exercise and weight loss for multiple medical comorbidities.

## 2024-05-17 NOTE — PROGRESS NOTES
Venipuncture Blood Specimen Collection  Venipuncture performed in right arm by Carley Duncan MA with good hemostasis. Patient tolerated the procedure well without complications.   05/17/24   Carley Duncan MA

## 2024-05-17 NOTE — ASSESSMENT & PLAN NOTE
Diagnosis 3/9/2021. EKG 5/17/2024 with sinus rhythm, with isolated and non-concerning right bundle branch block, unchanged to 3/9/2021.  Continue good blood pressure control, continue losartan/HCTZ 100/25 mg, increased from previous dose of 50/12.5 on 3/11/2022.  Good results in clinic but he needs to start monitoring blood pressure more regularly at home.  Continue healthy diet, exercise, weight loss, decrease salt intake, et cetera. Advise concerns.

## 2024-05-17 NOTE — ASSESSMENT & PLAN NOTE
Involving the paraspinal muscles more so than cervical spinous process region and lower cervical levels, somewhat acute on chronic with worsening last months compared to last couple years.  No concerning neurologic manifestations.  Obtain x-ray of the cervical spine with manage per results but if reassuring plan physical therapy to evaluate and treat.  Naproxen 375 mg twice daily as needed, using short burst.  Heating pad, light stretching.  Advise concerns.

## 2024-05-18 LAB
ALBUMIN SERPL-MCNC: 4.4 G/DL (ref 3.8–4.9)
ALBUMIN/GLOB SERPL: 1.7 {RATIO} (ref 1.2–2.2)
ALP SERPL-CCNC: 124 IU/L (ref 44–121)
ALT SERPL-CCNC: 18 IU/L (ref 0–44)
APPEARANCE UR: CLEAR
AST SERPL-CCNC: 20 IU/L (ref 0–40)
BACTERIA #/AREA URNS HPF: NORMAL /[HPF]
BASOPHILS # BLD AUTO: 0 X10E3/UL (ref 0–0.2)
BASOPHILS NFR BLD AUTO: 1 %
BILIRUB SERPL-MCNC: 0.7 MG/DL (ref 0–1.2)
BILIRUB UR QL STRIP: NEGATIVE
BUN SERPL-MCNC: 20 MG/DL (ref 6–24)
BUN/CREAT SERPL: 13 (ref 9–20)
CALCIUM SERPL-MCNC: 9.7 MG/DL (ref 8.7–10.2)
CASTS URNS QL MICRO: NORMAL /LPF
CHLORIDE SERPL-SCNC: 101 MMOL/L (ref 96–106)
CHOLEST SERPL-MCNC: 227 MG/DL (ref 100–199)
CO2 SERPL-SCNC: 22 MMOL/L (ref 20–29)
COLOR UR: YELLOW
CREAT SERPL-MCNC: 1.6 MG/DL (ref 0.76–1.27)
EGFRCR SERPLBLD CKD-EPI 2021: 49 ML/MIN/1.73
EOSINOPHIL # BLD AUTO: 0.2 X10E3/UL (ref 0–0.4)
EOSINOPHIL NFR BLD AUTO: 4 %
EPI CELLS #/AREA URNS HPF: NORMAL /HPF (ref 0–10)
ERYTHROCYTE [DISTWIDTH] IN BLOOD BY AUTOMATED COUNT: 13 % (ref 11.6–15.4)
GLOBULIN SER CALC-MCNC: 2.6 G/DL (ref 1.5–4.5)
GLUCOSE SERPL-MCNC: 106 MG/DL (ref 70–99)
GLUCOSE UR QL STRIP: NEGATIVE
HCT VFR BLD AUTO: 54.8 % (ref 37.5–51)
HDLC SERPL-MCNC: 33 MG/DL
HGB BLD-MCNC: 17.1 G/DL (ref 13–17.7)
HGB UR QL STRIP: NEGATIVE
IMM GRANULOCYTES # BLD AUTO: 0 X10E3/UL (ref 0–0.1)
IMM GRANULOCYTES NFR BLD AUTO: 0 %
KETONES UR QL STRIP: NEGATIVE
LDLC SERPL CALC-MCNC: 145 MG/DL (ref 0–99)
LEUKOCYTE ESTERASE UR QL STRIP: NEGATIVE
LYMPHOCYTES # BLD AUTO: 2.1 X10E3/UL (ref 0.7–3.1)
LYMPHOCYTES NFR BLD AUTO: 33 %
MCH RBC QN AUTO: 27.9 PG (ref 26.6–33)
MCHC RBC AUTO-ENTMCNC: 31.2 G/DL (ref 31.5–35.7)
MCV RBC AUTO: 90 FL (ref 79–97)
MICRO URNS: NORMAL
MICRO URNS: NORMAL
MONOCYTES # BLD AUTO: 0.6 X10E3/UL (ref 0.1–0.9)
MONOCYTES NFR BLD AUTO: 9 %
NEUTROPHILS # BLD AUTO: 3.3 X10E3/UL (ref 1.4–7)
NEUTROPHILS NFR BLD AUTO: 53 %
NITRITE UR QL STRIP: NEGATIVE
PH UR STRIP: 5.5 [PH] (ref 5–7.5)
PLATELET # BLD AUTO: 310 X10E3/UL (ref 150–450)
POTASSIUM SERPL-SCNC: 5.2 MMOL/L (ref 3.5–5.2)
PROT SERPL-MCNC: 7 G/DL (ref 6–8.5)
PROT UR QL STRIP: NEGATIVE
PSA SERPL-MCNC: 0.3 NG/ML (ref 0–4)
RBC # BLD AUTO: 6.12 X10E6/UL (ref 4.14–5.8)
RBC #/AREA URNS HPF: NORMAL /HPF (ref 0–2)
SODIUM SERPL-SCNC: 140 MMOL/L (ref 134–144)
SP GR UR STRIP: 1.02 (ref 1–1.03)
TESTOST SERPL-MCNC: 381 NG/DL (ref 264–916)
TRIGL SERPL-MCNC: 268 MG/DL (ref 0–149)
TSH SERPL DL<=0.005 MIU/L-ACNC: 2.8 UIU/ML (ref 0.45–4.5)
URINALYSIS REFLEX: NORMAL
UROBILINOGEN UR STRIP-MCNC: 0.2 MG/DL (ref 0.2–1)
VLDLC SERPL CALC-MCNC: 49 MG/DL (ref 5–40)
WBC # BLD AUTO: 6.3 X10E3/UL (ref 3.4–10.8)
WBC #/AREA URNS HPF: NORMAL /HPF (ref 0–5)

## 2024-05-20 ENCOUNTER — TELEPHONE (OUTPATIENT)
Dept: FAMILY MEDICINE CLINIC | Facility: CLINIC | Age: 59
End: 2024-05-20
Payer: MEDICAID

## 2024-05-31 ENCOUNTER — TELEPHONE (OUTPATIENT)
Dept: FAMILY MEDICINE CLINIC | Facility: CLINIC | Age: 59
End: 2024-05-31
Payer: MEDICAID

## 2024-05-31 DIAGNOSIS — F17.210 CIGARETTE SMOKER: ICD-10-CM

## 2024-10-08 DIAGNOSIS — I10 ESSENTIAL HYPERTENSION: ICD-10-CM

## 2024-10-08 RX ORDER — LOSARTAN POTASSIUM AND HYDROCHLOROTHIAZIDE 25; 100 MG/1; MG/1
1 TABLET ORAL DAILY
Qty: 90 TABLET | Refills: 1 | Status: SHIPPED | OUTPATIENT
Start: 2024-10-08

## 2024-11-08 DIAGNOSIS — R79.89 LOW TESTOSTERONE IN MALE: ICD-10-CM

## 2024-11-11 RX ORDER — TESTOSTERONE 20.25 MG/1.25G
GEL TOPICAL
Qty: 150 G | Refills: 0 | Status: SHIPPED | OUTPATIENT
Start: 2024-11-11

## 2024-11-15 ENCOUNTER — OFFICE VISIT (OUTPATIENT)
Dept: FAMILY MEDICINE CLINIC | Facility: CLINIC | Age: 59
End: 2024-11-15

## 2024-11-15 VITALS
BODY MASS INDEX: 36.37 KG/M2 | HEART RATE: 80 BPM | TEMPERATURE: 98.7 F | SYSTOLIC BLOOD PRESSURE: 120 MMHG | HEIGHT: 68 IN | DIASTOLIC BLOOD PRESSURE: 82 MMHG | OXYGEN SATURATION: 98 % | WEIGHT: 240 LBS

## 2024-11-15 DIAGNOSIS — E78.2 MIXED HYPERLIPIDEMIA: ICD-10-CM

## 2024-11-15 DIAGNOSIS — F17.210 CIGARETTE SMOKER: ICD-10-CM

## 2024-11-15 DIAGNOSIS — E66.09 CLASS 2 OBESITY DUE TO EXCESS CALORIES WITHOUT SERIOUS COMORBIDITY WITH BODY MASS INDEX (BMI) OF 36.0 TO 36.9 IN ADULT: ICD-10-CM

## 2024-11-15 DIAGNOSIS — R79.89 LOW TESTOSTERONE IN MALE: ICD-10-CM

## 2024-11-15 DIAGNOSIS — I10 ESSENTIAL HYPERTENSION: ICD-10-CM

## 2024-11-15 DIAGNOSIS — G47.10 HYPERSOMNIA: ICD-10-CM

## 2024-11-15 DIAGNOSIS — N18.31 CHRONIC RENAL FAILURE, STAGE 3A: ICD-10-CM

## 2024-11-15 DIAGNOSIS — N52.9 VASCULOGENIC ERECTILE DYSFUNCTION, UNSPECIFIED VASCULOGENIC ERECTILE DYSFUNCTION TYPE: ICD-10-CM

## 2024-11-15 DIAGNOSIS — E66.812 CLASS 2 OBESITY DUE TO EXCESS CALORIES WITHOUT SERIOUS COMORBIDITY WITH BODY MASS INDEX (BMI) OF 36.0 TO 36.9 IN ADULT: ICD-10-CM

## 2024-11-15 DIAGNOSIS — Z23 NEED FOR VACCINATION: ICD-10-CM

## 2024-11-15 DIAGNOSIS — R73.03 PREDIABETES: Primary | ICD-10-CM

## 2024-11-15 PROBLEM — N18.9 CHRONIC RENAL FAILURE: Status: ACTIVE | Noted: 2024-11-15

## 2024-11-15 LAB
EXPIRATION DATE: ABNORMAL
EXPIRATION DATE: NORMAL
GLUCOSE BLDC GLUCOMTR-MCNC: 106 MG/DL (ref 70–130)
HBA1C MFR BLD: 6 % (ref 4.5–5.7)
Lab: ABNORMAL
Lab: NORMAL

## 2024-11-15 NOTE — ASSESSMENT & PLAN NOTE
Discussed 5/7/2024 with component of regular snoring, sometimes breath-holding and potentially variable pattern of daytime fatigue and sleepiness. referral on 5/7/2024 for sleep evaluation but since that time the patient does not feel that actually snoring that much or breath-holding and declines evaluation although I have reinforced the importance of he is having the symptoms to be assessed as treatment could be significant beneficial for symptoms and for cardiovascular risk benefit.  He understands.  He still declines evaluation.

## 2024-11-15 NOTE — ASSESSMENT & PLAN NOTE
5/17/2024 total cholesterol 227, triglycerides 268, HDL 33, .  Comparison 2/17/2023 total cholesterol 204, triglycerides 295, HDL 30, , 7/23/2021 total cholesterol 198, triglycerides 226, HDL 28, .  Not taking any medicine at this time for cholesterol and not quite to the threshold to initiate despite modest worsening.  Modest dyslipidemia pattern, reinforced importance of healthy diet, exercise and benefits of weight loss.  Monitor yearly but if were to increase further in the future we would then have to consider statin therapy.

## 2024-11-15 NOTE — ASSESSMENT & PLAN NOTE
Most recent blood work 5/17/2024 with creatinine 1.60, GFR 49 compared to 1.43, 57 the year prior.  Inpatient with hypertension, prediabetes, likely etiology.  No indication for nephrology referral at this time but we will monitor closely.  Recheck pending with blood work today 11/15/2024.  Management per results.

## 2024-11-15 NOTE — ASSESSMENT & PLAN NOTE
Diagnoses with hemoglobin A1c 5.7 on 6/17/2022.  Today's hemoglobin A1c modestly worsened 6.0% compared to previous 5.5%, 5.9%, 5.9% on 2/17/2023, prior to that at diagnosis 5.7% on 6/17/2022.  Fairly notable increase in to mid prediabetic range, reinforced importance of healthy diet, exercise and weight loss.  Caution polyuria and polydipsia sign of progression of diabetes.  with more notable increase with today's visit I would like to recheck his hemoglobin A1c in 3 months time to ensure not transitioning further upwards.

## 2024-11-15 NOTE — ASSESSMENT & PLAN NOTE
Felt in part related to low testosterone level as discussed initially 3/11/2022. Clinically improved with replacement, if recurrent or persistent in the future we could consider adding sildenafil or comparable medication.  Still having good improvement as of 11/15/2024, advise concerns.

## 2024-11-15 NOTE — ASSESSMENT & PLAN NOTE
1 pack/day smoking.  Historically One pack per day for 30 years,.  He is not ready to pursue cessation but he understands risks.  Advise desire to pursue cessation with medicine.  Low-dose CT of the chest negative 5/17/2024, prior to that  3/3/2023.  Monitor yearly.

## 2024-11-15 NOTE — PROGRESS NOTES
Venipuncture Blood Specimen Collection  Venipuncture performed in right hand by Tiffanie Wolff MA with good hemostasis. Patient tolerated the procedure well without complications.   11/15/24   Tiffanie Wolff MA

## 2024-11-15 NOTE — ASSESSMENT & PLAN NOTE
Diagnosis 3/9/2021. EKG 5/17/2024 with sinus rhythm, with isolated and non-concerning right bundle branch block, unchanged to 3/9/2021.  Continue good blood pressure control, continue losartan/HCTZ 100/25 mg, increased from previous dose of 50/12.5 on 3/11/2022.  Good results in clinic but he needs to start monitoring blood pressure more regularly at home.  Continue healthy diet, exercise, weight loss, decrease salt intake, et cetera.  No new concerns as of 11/15/2024.

## 2024-11-15 NOTE — PROGRESS NOTES
Follow Up Office Visit      Date: 11/15/2024   Patient Name: Isai Bush  : 1965   MRN: [id number]     Chief Complaint:    Chief Complaint   Patient presents with    Med Refill       History of Present Illness: Isai Bush is a 59 y.o. male who is here today to follow up with problems.  Regarding prediabetic pattern, possibly some modest slack in diet with a bit more intake of high-calorie beverages including specifically sweet tea.  Weight has been stable.  Otherwise testosterone replacement continues to have good clinical benefit, he feels better and has good erectile pattern, improved prior to when he was not taking medicine.  Smoking still a pack per day, not interested in pursuing cessation.  Blood pressure not checked at home but no lightheadedness or dizziness.  No palpitations.  With cholesterol and obesity, similar prediabetic pattern, no significant prudent diet and activity despite recommendation to pursue.  Modest increase in his kidney function at last visit, reinforced importance of good hydration, we will recheck today with blood work..    Subjective      Review of Systems:   Review of Systems    I have reviewed the patients family history, social history, past medical history, past surgical history and have updated it as appropriate.     Medications:     Current Outpatient Medications:     cetirizine (zyrTEC) 10 MG tablet, Take 1 tablet by mouth Daily., Disp: 30 tablet, Rfl: 3    fluticasone (FLONASE) 50 MCG/ACT nasal spray, 2 sprays into the nostril(s) as directed by provider Daily., Disp: 15.8 mL, Rfl: 3    losartan-hydrochlorothiazide (HYZAAR) 100-25 MG per tablet, Take 1 tablet by mouth Daily., Disp: 90 tablet, Rfl: 1    naproxen (NAPROSYN) 375 MG tablet, Take 1 tablet by mouth 2 (Two) Times a Day As Needed for Mild Pain., Disp: 60 tablet, Rfl: 1    Testosterone 1.62 % gel, APPLY 4 PUMPS TOPICALLY TO THE SHOULDERS AND UPPER ARMS ONCE A DAY, Disp: 150 g, Rfl: 0    Allergies:  "  No Known Allergies    Objective     Physical Exam: Please see above  Vital Signs:   Vitals:    11/15/24 0818   BP: 120/82   BP Location: Left arm   Patient Position: Sitting   Cuff Size: Adult   Pulse: 80   Temp: 98.7 °F (37.1 °C)   TempSrc: Temporal   SpO2: 98%   Weight: 109 kg (240 lb)   Height: 171.5 cm (67.5\")     Facility age limit for growth %darin is 20 years.  Body mass index is 37.03 kg/m².    Physical Exam  Constitutional:       General: He is not in acute distress.     Appearance: Normal appearance. He is obese. He is not ill-appearing, toxic-appearing or diaphoretic.   HENT:      Head: Normocephalic and atraumatic.      Right Ear: Tympanic membrane, ear canal and external ear normal.      Left Ear: Tympanic membrane, ear canal and external ear normal.      Nose: Nose normal. No rhinorrhea.      Mouth/Throat:      Mouth: Mucous membranes are moist.      Pharynx: Oropharynx is clear. No oropharyngeal exudate or posterior oropharyngeal erythema.   Neck:      Vascular: No carotid bruit.   Cardiovascular:      Rate and Rhythm: Normal rate and regular rhythm.      Pulses: Normal pulses.      Heart sounds: Normal heart sounds. No murmur heard.     No friction rub. No gallop.   Pulmonary:      Effort: Pulmonary effort is normal. No respiratory distress.      Breath sounds: Normal breath sounds. No stridor. No wheezing.   Abdominal:      General: Abdomen is flat. Bowel sounds are normal. There is no distension.      Palpations: Abdomen is soft. There is no mass.      Tenderness: There is no abdominal tenderness. There is no guarding or rebound.   Musculoskeletal:      Cervical back: Neck supple. No tenderness.      Right lower leg: No edema.      Left lower leg: No edema.   Lymphadenopathy:      Cervical: No cervical adenopathy.   Skin:     General: Skin is warm and dry.      Capillary Refill: Capillary refill takes less than 2 seconds.   Neurological:      General: No focal deficit present.      Mental Status: " He is alert and oriented to person, place, and time. Mental status is at baseline.   Psychiatric:         Mood and Affect: Mood normal.         Behavior: Behavior normal.         Thought Content: Thought content normal.         Procedures    Results:   Labs:   Hemoglobin A1C   Date Value Ref Range Status   11/15/2024 6.0 (A) 4.5 - 5.7 % Final     TSH   Date Value Ref Range Status   05/17/2024 2.800 0.450 - 4.500 uIU/mL Final        Imaging:   No valid procedures specified.     Smoking Cessation:   3-10 mintues spent counseling Will try to cut down    Vaccine Counseling:      Patient's (Body mass index is 37.03 kg/m².) indicates that they are obese (BMI >30) with health related conditions that include hypertension, impaired fasting glucose, and dyslipidemias . Weight is unchanged. BMI is is above average; BMI management plan is completed. We discussed low calorie, low carb based diet program, portion control, increasing exercise, and joining a fitness center or start home based exercise program.       Assessment / Plan      Assessment/Plan:   Diagnoses and all orders for this visit:    1. Prediabetes (Primary)  Assessment & Plan:  Diagnoses with hemoglobin A1c 5.7 on 6/17/2022.  Today's hemoglobin A1c modestly worsened 6.0% compared to previous 5.5%, 5.9%, 5.9% on 2/17/2023, prior to that at diagnosis 5.7% on 6/17/2022.  Fairly notable increase in to mid prediabetic range, reinforced importance of healthy diet, exercise and weight loss.  Caution polyuria and polydipsia sign of progression of diabetes.  with more notable increase with today's visit I would like to recheck his hemoglobin A1c in 3 months time to ensure not transitioning further upwards.     Orders:  -     POC Glycosylated Hemoglobin (Hb A1C)  -     POC Glucose, Blood    2. Low testosterone in male  Assessment & Plan:  Most recent total testosterone 381 on 5/17/2024, previous 538 on 2/17/2023 after increasing from 2 pumps daily up to 4 pumps daily of the  AndroGel 1.60%, 6/25/2022 surprisingly slightly increased to 229, comparison 3/26/2022 243, and 3/19/2022 at 248.  Of note, previous testing on 6/25/2022 related to testosterone administration includes CBC with differential within normal limits, CMP with notable normal liver function test, although creatinine was a little bit increased 1.5, compared to normal previous 1.3.  Also, PSA normal 0.30 with comparison 0.2 on 3/19/2022.  Recheck today on 11/15/2024 total testosterone, CBC to monitor blood counts as he has a little bit high normal hemoglobin hematocrit, CMP for liver function test, PSA for monitoring. In general he should have monitoring every 6 months at least of total testosterone, and additionally yearly CBC and CMP with PSA, now that he has stability.  Continue previously good range of dosing of 4 pumps per day of AndroGel 1.62%, with recheck of blood work as noted above.    Orders:  -     CBC & Differential; Future  -     Comprehensive Metabolic Panel; Future  -     Testosterone; Future  -     PSA DIAGNOSTIC ONLY; Future  -     PSA DIAGNOSTIC ONLY  -     Testosterone  -     Comprehensive Metabolic Panel  -     CBC & Differential    3. Hypersomnia  Assessment & Plan:  Discussed 5/7/2024 with component of regular snoring, sometimes breath-holding and potentially variable pattern of daytime fatigue and sleepiness. referral on 5/7/2024 for sleep evaluation but since that time the patient does not feel that actually snoring that much or breath-holding and declines evaluation although I have reinforced the importance of he is having the symptoms to be assessed as treatment could be significant beneficial for symptoms and for cardiovascular risk benefit.  He understands.  He still declines evaluation.       4. Cigarette smoker  Assessment & Plan:  1 pack/day smoking.  Historically One pack per day for 30 years,.  He is not ready to pursue cessation but he understands risks.  Advise desire to pursue cessation  with medicine.  Low-dose CT of the chest negative 5/17/2024, prior to that  3/3/2023.  Monitor yearly.      5. Class 2 obesity due to excess calories without serious comorbidity with body mass index (BMI) of 36.0 to 36.9 in adult  Assessment & Plan:  BMI in the mid 30s range, reinforced importance of healthy diet, exercise and weight loss for multiple medical comorbidities.       6. Mixed hyperlipidemia  Assessment & Plan:  5/17/2024 total cholesterol 227, triglycerides 268, HDL 33, .  Comparison 2/17/2023 total cholesterol 204, triglycerides 295, HDL 30, , 7/23/2021 total cholesterol 198, triglycerides 226, HDL 28, .  Not taking any medicine at this time for cholesterol and not quite to the threshold to initiate despite modest worsening.  Modest dyslipidemia pattern, reinforced importance of healthy diet, exercise and benefits of weight loss.  Monitor yearly but if were to increase further in the future we would then have to consider statin therapy.      7. Essential hypertension  Assessment & Plan:  Diagnosis 3/9/2021. EKG 5/17/2024 with sinus rhythm, with isolated and non-concerning right bundle branch block, unchanged to 3/9/2021.  Continue good blood pressure control, continue losartan/HCTZ 100/25 mg, increased from previous dose of 50/12.5 on 3/11/2022.  Good results in clinic but he needs to start monitoring blood pressure more regularly at home.  Continue healthy diet, exercise, weight loss, decrease salt intake, et cetera.  No new concerns as of 11/15/2024.    Orders:  -     Comprehensive Metabolic Panel; Future  -     Comprehensive Metabolic Panel    8. Vasculogenic erectile dysfunction, unspecified vasculogenic erectile dysfunction type  Assessment & Plan:  Felt in part related to low testosterone level as discussed initially 3/11/2022. Clinically improved with replacement, if recurrent or persistent in the future we could consider adding sildenafil or comparable medication.  Still  having good improvement as of 11/15/2024, advise concerns.      9. Chronic renal failure, stage 3a  Assessment & Plan:  Most recent blood work 5/17/2024 with creatinine 1.60, GFR 49 compared to 1.43, 57 the year prior.  Inpatient with hypertension, prediabetes, likely etiology.  No indication for nephrology referral at this time but we will monitor closely.  Recheck pending with blood work today 11/15/2024.  Management per results.    Orders:  -     Comprehensive Metabolic Panel; Future  -     Comprehensive Metabolic Panel    10. Need for vaccination  Assessment & Plan:  Patient declines pneumococcal 20 valent vaccine, flu vaccine and COVID-vaccine.          Follow Up:   Return in about 3 months (around 2/15/2025) for Next scheduled follow up.      Ulises Maldonado MD  [facility] [place] [facility]

## 2024-11-15 NOTE — ASSESSMENT & PLAN NOTE
Most recent total testosterone 381 on 5/17/2024, previous 538 on 2/17/2023 after increasing from 2 pumps daily up to 4 pumps daily of the AndroGel 1.60%, 6/25/2022 surprisingly slightly increased to 229, comparison 3/26/2022 243, and 3/19/2022 at 248.  Of note, previous testing on 6/25/2022 related to testosterone administration includes CBC with differential within normal limits, CMP with notable normal liver function test, although creatinine was a little bit increased 1.5, compared to normal previous 1.3.  Also, PSA normal 0.30 with comparison 0.2 on 3/19/2022.  Recheck today on 11/15/2024 total testosterone, CBC to monitor blood counts as he has a little bit high normal hemoglobin hematocrit, CMP for liver function test, PSA for monitoring. In general he should have monitoring every 6 months at least of total testosterone, and additionally yearly CBC and CMP with PSA, now that he has stability.  Continue previously good range of dosing of 4 pumps per day of AndroGel 1.62%, with recheck of blood work as noted above.

## 2024-11-16 LAB
ALBUMIN SERPL-MCNC: 4.2 G/DL (ref 3.8–4.9)
ALP SERPL-CCNC: 100 IU/L (ref 44–121)
ALT SERPL-CCNC: 14 IU/L (ref 0–44)
AST SERPL-CCNC: 12 IU/L (ref 0–40)
BASOPHILS # BLD AUTO: 0.1 X10E3/UL (ref 0–0.2)
BASOPHILS NFR BLD AUTO: 1 %
BILIRUB SERPL-MCNC: 0.9 MG/DL (ref 0–1.2)
BUN SERPL-MCNC: 31 MG/DL (ref 8–27)
BUN/CREAT SERPL: 17 (ref 10–24)
CALCIUM SERPL-MCNC: 9.1 MG/DL (ref 8.6–10.2)
CHLORIDE SERPL-SCNC: 102 MMOL/L (ref 96–106)
CO2 SERPL-SCNC: 21 MMOL/L (ref 20–29)
CREAT SERPL-MCNC: 1.87 MG/DL (ref 0.76–1.27)
EGFRCR SERPLBLD CKD-EPI 2021: 41 ML/MIN/1.73
EOSINOPHIL # BLD AUTO: 0.4 X10E3/UL (ref 0–0.4)
EOSINOPHIL NFR BLD AUTO: 5 %
ERYTHROCYTE [DISTWIDTH] IN BLOOD BY AUTOMATED COUNT: 13 % (ref 11.6–15.4)
GLOBULIN SER CALC-MCNC: 2.7 G/DL (ref 1.5–4.5)
GLUCOSE SERPL-MCNC: 100 MG/DL (ref 70–99)
HCT VFR BLD AUTO: 54.3 % (ref 37.5–51)
HGB BLD-MCNC: 17.3 G/DL (ref 13–17.7)
IMM GRANULOCYTES # BLD AUTO: 0 X10E3/UL (ref 0–0.1)
IMM GRANULOCYTES NFR BLD AUTO: 0 %
LYMPHOCYTES # BLD AUTO: 2.8 X10E3/UL (ref 0.7–3.1)
LYMPHOCYTES NFR BLD AUTO: 38 %
MCH RBC QN AUTO: 28.7 PG (ref 26.6–33)
MCHC RBC AUTO-ENTMCNC: 31.9 G/DL (ref 31.5–35.7)
MCV RBC AUTO: 90 FL (ref 79–97)
MONOCYTES # BLD AUTO: 0.5 X10E3/UL (ref 0.1–0.9)
MONOCYTES NFR BLD AUTO: 7 %
NEUTROPHILS # BLD AUTO: 3.7 X10E3/UL (ref 1.4–7)
NEUTROPHILS NFR BLD AUTO: 49 %
PLATELET # BLD AUTO: 296 X10E3/UL (ref 150–450)
POTASSIUM SERPL-SCNC: 5.2 MMOL/L (ref 3.5–5.2)
PROT SERPL-MCNC: 6.9 G/DL (ref 6–8.5)
PSA SERPL-MCNC: 0.3 NG/ML (ref 0–4)
RBC # BLD AUTO: 6.03 X10E6/UL (ref 4.14–5.8)
SODIUM SERPL-SCNC: 137 MMOL/L (ref 134–144)
TESTOST SERPL-MCNC: 393 NG/DL (ref 264–916)
WBC # BLD AUTO: 7.5 X10E3/UL (ref 3.4–10.8)

## 2024-11-20 ENCOUNTER — TELEPHONE (OUTPATIENT)
Dept: FAMILY MEDICINE CLINIC | Facility: CLINIC | Age: 59
End: 2024-11-20
Payer: MEDICAID

## 2024-11-20 NOTE — TELEPHONE ENCOUNTER
----- Message from Ulises Maldonado sent at 11/20/2024  2:27 PM EST -----  Please speak to the patient regarding laboratory vesication's as obtained 11/15/2024.  Notable results as follows:    CMP with glucose 100, kidney function with some modest worsening now with creatinine 1.87, GFR 41 compared to 1.60 and 49 on 5/17/2024.  Otherwise normal electrolytes, proteins and liver function test.  CBC with differential with normal white count of 7.5, hemoglobin 17.3 compared to 17.1 previous which are both high normal, hematocrit slightly increased at 54.3 compared to previous 54.8 but overall stable pattern.  Otherwise normal blood counts.  Total testosterone stable at 393 compared to 381 6 months ago.  PSA normal at 0.3 compared to 0.3 6 months ago and 0.4 1-year ago.    Related to worsening kidney function, at this time I would like him to have a bilateral renal ultrasound obtained with management results.  Nonetheless high probability of recommending nephrology referral subsequently but I would like to have that result initially.  If patient is agreeable please advise and I will set up bilateral renal ultrasound through provide location, I would expect either at [facility] or [facility], please advise preference.  Otherwise follow-up on testosterone is stable with normal PSA and stable slightly high normal blood counts.  No other intervention necessary.

## 2024-12-31 ENCOUNTER — OFFICE VISIT (OUTPATIENT)
Dept: FAMILY MEDICINE CLINIC | Facility: CLINIC | Age: 59
End: 2024-12-31

## 2024-12-31 VITALS
DIASTOLIC BLOOD PRESSURE: 76 MMHG | TEMPERATURE: 98.2 F | SYSTOLIC BLOOD PRESSURE: 122 MMHG | WEIGHT: 244 LBS | BODY MASS INDEX: 37.65 KG/M2

## 2024-12-31 DIAGNOSIS — N18.32 CHRONIC RENAL FAILURE, STAGE 3B: Primary | ICD-10-CM

## 2024-12-31 DIAGNOSIS — J45.21 MILD INTERMITTENT ASTHMA WITH EXACERBATION: ICD-10-CM

## 2024-12-31 DIAGNOSIS — J20.8 ACUTE BRONCHITIS DUE TO OTHER SPECIFIED ORGANISMS: ICD-10-CM

## 2024-12-31 DIAGNOSIS — F17.210 CIGARETTE SMOKER: ICD-10-CM

## 2024-12-31 PROCEDURE — 99214 OFFICE O/P EST MOD 30 MIN: CPT | Performed by: INTERNAL MEDICINE

## 2024-12-31 RX ORDER — INHALER, ASSIST DEVICES
SPACER (EA) MISCELLANEOUS
Qty: 1 EACH | Refills: 0 | Status: SHIPPED | OUTPATIENT
Start: 2024-12-31 | End: 2025-12-31

## 2024-12-31 RX ORDER — PREDNISONE 10 MG/1
30 TABLET ORAL DAILY
Qty: 15 TABLET | Refills: 0 | Status: SHIPPED | OUTPATIENT
Start: 2024-12-31 | End: 2025-01-05

## 2024-12-31 RX ORDER — ALBUTEROL SULFATE 90 UG/1
2 INHALANT RESPIRATORY (INHALATION) EVERY 4 HOURS PRN
Qty: 18 G | Refills: 2 | Status: SHIPPED | OUTPATIENT
Start: 2024-12-31

## 2024-12-31 RX ORDER — GUAIFENESIN/DEXTROMETHORPHAN 100-10MG/5
5 SYRUP ORAL 3 TIMES DAILY PRN
Qty: 120 ML | Refills: 0 | Status: SHIPPED | OUTPATIENT
Start: 2024-12-31

## 2024-12-31 RX ORDER — AZITHROMYCIN 250 MG/1
TABLET, FILM COATED ORAL
Qty: 6 TABLET | Refills: 0 | Status: SHIPPED | OUTPATIENT
Start: 2024-12-31

## 2024-12-31 NOTE — ASSESSMENT & PLAN NOTE
Lingering congestion drainage and cough felt to be initially viral in nature but with female 3 weeks duration some consideration of secondary atypical bacterial pathogen was trigger, as such cover with a Z-Marlo to take as directed.  Otherwise consistent plan for mild intermittent asthma for the asthmatic treatment.

## 2024-12-31 NOTE — ASSESSMENT & PLAN NOTE
Most recent blood work 11/15/2024 with creatinine increased 1.87, GFR 41.  Comparison 5/17/2024 with creatinine 1.60, GFR 49 compared to 1.43, 57 the year prior.  Inpatient with hypertension, prediabetes, likely etiology.  With this increase I would like to go ahead and do it a bilateral renal ultrasound, with probable referral to nephrology pending that result.
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
## 2024-12-31 NOTE — ASSESSMENT & PLAN NOTE
1 pack/day smoking.  Historically One pack per day for 30 years,.  He is not ready to pursue cessation but he understands risks.  Advise desire to pursue cessation with medicine.  Low-dose CT of the chest negative 5/17/2024, prior to that  3/3/2023.  Monitor yearly.      Of note especially in context of asthmatic trigger discussed the importance of pursuing smoking cessation which will increase the likelihood of similar flares in the future, if he has ongoing smoking.

## 2024-12-31 NOTE — ASSESSMENT & PLAN NOTE
Secondary initially to viral pattern, over the last few weeks persisting symptoms felt to be potential atypical bacterial nature.  Either way having asthmatic response which is modest and likely exacerbated by known smoking history.  Initiate prednisone 10 mg tablet 3 tablets daily x 5 days.  Albuterol inhaler with spacer 2 puffs every 4-6 hours the next few days, then as needed.  Advise if not improving.

## 2025-01-17 ENCOUNTER — TELEPHONE (OUTPATIENT)
Dept: FAMILY MEDICINE CLINIC | Facility: CLINIC | Age: 60
End: 2025-01-17
Payer: MEDICAID

## 2025-01-17 DIAGNOSIS — N18.32 CHRONIC RENAL FAILURE, STAGE 3B: ICD-10-CM

## 2025-01-17 NOTE — TELEPHONE ENCOUNTER
----- Message from Ulises Maldonado sent at 1/17/2025  2:55 PM EST -----  Please advise patient results of ultrasound of both kidneys as obtained at [facility] on 1/17/2025 related to abnormal kidney function.  Results reveal normal kidneys with no abnormality, no intervention necessary based on these results.      Otherwise continue treatment as outlined in previous visits.  Advise questions or concerns.

## 2025-01-27 DIAGNOSIS — R79.89 LOW TESTOSTERONE IN MALE: ICD-10-CM

## 2025-01-27 RX ORDER — TESTOSTERONE 20.25 MG/1.25G
GEL TOPICAL
Qty: 150 G | Refills: 0 | Status: SHIPPED | OUTPATIENT
Start: 2025-01-27

## 2025-02-14 ENCOUNTER — OFFICE VISIT (OUTPATIENT)
Dept: FAMILY MEDICINE CLINIC | Facility: CLINIC | Age: 60
End: 2025-02-14
Payer: COMMERCIAL

## 2025-02-14 VITALS
HEIGHT: 68 IN | WEIGHT: 247 LBS | RESPIRATION RATE: 18 BRPM | SYSTOLIC BLOOD PRESSURE: 122 MMHG | HEART RATE: 80 BPM | TEMPERATURE: 98.2 F | OXYGEN SATURATION: 99 % | DIASTOLIC BLOOD PRESSURE: 80 MMHG | BODY MASS INDEX: 37.44 KG/M2

## 2025-02-14 DIAGNOSIS — N18.32 CHRONIC RENAL FAILURE, STAGE 3B: ICD-10-CM

## 2025-02-14 DIAGNOSIS — R80.8 OTHER PROTEINURIA: ICD-10-CM

## 2025-02-14 DIAGNOSIS — N52.9 VASCULOGENIC ERECTILE DYSFUNCTION, UNSPECIFIED VASCULOGENIC ERECTILE DYSFUNCTION TYPE: ICD-10-CM

## 2025-02-14 DIAGNOSIS — E66.812 CLASS 2 OBESITY DUE TO EXCESS CALORIES WITHOUT SERIOUS COMORBIDITY WITH BODY MASS INDEX (BMI) OF 36.0 TO 36.9 IN ADULT: ICD-10-CM

## 2025-02-14 DIAGNOSIS — R79.89 LOW TESTOSTERONE IN MALE: ICD-10-CM

## 2025-02-14 DIAGNOSIS — E78.2 MIXED HYPERLIPIDEMIA: ICD-10-CM

## 2025-02-14 DIAGNOSIS — I10 ESSENTIAL HYPERTENSION: ICD-10-CM

## 2025-02-14 DIAGNOSIS — F17.210 CIGARETTE SMOKER: ICD-10-CM

## 2025-02-14 DIAGNOSIS — E66.09 CLASS 2 OBESITY DUE TO EXCESS CALORIES WITHOUT SERIOUS COMORBIDITY WITH BODY MASS INDEX (BMI) OF 36.0 TO 36.9 IN ADULT: ICD-10-CM

## 2025-02-14 DIAGNOSIS — R73.03 PREDIABETES: Primary | ICD-10-CM

## 2025-02-14 LAB
EXPIRATION DATE: ABNORMAL
EXPIRATION DATE: NORMAL
GLUCOSE BLDC GLUCOMTR-MCNC: 103 MG/DL (ref 70–130)
HBA1C MFR BLD: 6 % (ref 4.5–5.7)
Lab: ABNORMAL
Lab: NORMAL

## 2025-02-14 NOTE — ASSESSMENT & PLAN NOTE
Diagnosis 3/9/2021. EKG 5/17/2024 with sinus rhythm, with isolated and non-concerning right bundle branch block, unchanged to 3/9/2021.  Continue good blood pressure control, continue losartan/HCTZ 100/25 mg, increased from previous dose of 50/12.5 on 3/11/2022.  Good results in clinic but he needs to start monitoring blood pressure more regularly at home.  Continue healthy diet, exercise, weight loss, decrease salt intake, et cetera.  No new concerns as of   2/14/2025.

## 2025-02-14 NOTE — ASSESSMENT & PLAN NOTE
Most recent total testosterone 393 on 11/15/2024 with additional reassuring PSA, previous 381 on 5/17/2024, previous 538 on 2/17/2023 after increasing from 2 pumps daily up to 4 pumps daily of the AndroGel 1.60%, 6/25/2022 surprisingly slightly increased to 229, comparison 3/26/2022 243, and 3/19/2022 at 248.  Of note, previous testing on 6/25/2022 related to testosterone administration includes CBC with differential within normal limits, CMP with notable normal liver function test, although creatinine was a little bit increased 1.5, compared to normal previous 1.3.  Also, PSA normal 0.30 with comparison 0.2 on 3/19/2022.  Clinically patient is seen benefit of testosterone replacement.  As just checked on, plan to recheck again at 3-month follow-up visit in May 2025 with 11/15/2024 total testosterone, CBC to monitor blood counts, CMP for liver function test, PSA. In general he should have monitoring every 6 months at least of total testosterone, and additionally yearly CBC and CMP with PSA, now that he has stability.  Continue previously good range of dosing of 4 pumps per day of AndroGel 1.62%.  Advise concerns.

## 2025-02-14 NOTE — ASSESSMENT & PLAN NOTE
Ongoing at 3/4-1 pack/day smoking.  Historically One pack per day for 30 years,.  He is not ready to pursue cessation but he understands risks.  Advise desire to pursue cessation with medicine.  Low-dose CT of the chest negative 5/17/2024, prior to that  3/3/2023.  Patient declines being ready to pursue cessation but if he changes his mind I will be happy to prescribe Chantix or nicotine patches as desired.

## 2025-02-14 NOTE — ASSESSMENT & PLAN NOTE
5/17/2024 total cholesterol 227, triglycerides 268, HDL 33, .  Comparison 2/17/2023 total cholesterol 204, triglycerides 295, HDL 30, , 7/23/2021 total cholesterol 198, triglycerides 226, HDL 28, .  Not taking any medicine at this time for cholesterol and not quite to the threshold to initiate despite modest worsening.  Modest dyslipidemia pattern, reinforced importance of healthy diet, exercise and benefits of weight loss.  Monitor yearly but if were to increase further in the future we would then have to consider statin therapy. No new concerns as of 2/14/2025.
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
## 2025-02-14 NOTE — ASSESSMENT & PLAN NOTE
Most recent blood work 11/15/2024 with creatinine increased 1.87, GFR 41.  Comparison 5/17/2024 with creatinine 1.60, GFR 49 compared to 1.43, 57 the year prior.  Patient with hypertension, prediabetes, likely etiology.  With this pattern I had obtained bilateral ultrasound of the kidneys which were nonrevealing and normal on 1/17/2025 at [facility].  I would like to go ahead and recheck a BMP today and if renal pattern is persisting the same, or worsening would plan to refer to nephrology. Additional consideration of adding medication such as Jardiance for renal protection.

## 2025-02-14 NOTE — ASSESSMENT & PLAN NOTE
Diagnoses with hemoglobin A1c 5.7 on 6/17/2022.  Today's hemoglobin A1c mode stable at 6.0% on 2/14/2025 after increased to 6.0% on 11/15/2024, prior 5.5%, 5.9%, 5.9% on 2/17/2023, prior to that at diagnosis 5.7% on 6/17/2022.  Fairly notable increase in to mid prediabetic range, reinforced importance of healthy diet, exercise and weight loss.  Caution polyuria and polydipsia sign of progression of diabetes.  Follow-up 3 months we will recheck, sooner as needed.

## 2025-02-14 NOTE — ASSESSMENT & PLAN NOTE
1+ proteinuria with 7/23/2021, blood work, cleared on recheck urinalysis in clinic 3/11/2022, and again cleared with blood work 5/17/2024.  Monitor with blood work yearly.   Discharge TC bili *** Discharge TC bili 8.3 @ 35 hours of life; low intermediate risk; phototherapy threshold 13.4mg/dL

## 2025-02-14 NOTE — PROGRESS NOTES
Venipuncture Blood Specimen Collection  Venipuncture performed in right arm by Carley Duncan MA with good hemostasis. Patient tolerated the procedure well without complications.   02/14/25   Carley Duncan MA

## 2025-02-14 NOTE — ASSESSMENT & PLAN NOTE
Felt in part related to low testosterone level as discussed initially 3/11/2022. Clinically improved with replacement, if recurrent or persistent in the future we could consider adding sildenafil or comparable medication.  Still having good symptom improvement as of 2/14/2025, advise concerns.

## 2025-02-15 LAB
BUN SERPL-MCNC: 20 MG/DL (ref 8–27)
BUN/CREAT SERPL: 12 (ref 10–24)
CALCIUM SERPL-MCNC: 9.3 MG/DL (ref 8.6–10.2)
CHLORIDE SERPL-SCNC: 100 MMOL/L (ref 96–106)
CO2 SERPL-SCNC: 25 MMOL/L (ref 20–29)
CREAT SERPL-MCNC: 1.72 MG/DL (ref 0.76–1.27)
EGFRCR SERPLBLD CKD-EPI 2021: 45 ML/MIN/1.73
GLUCOSE SERPL-MCNC: 99 MG/DL (ref 70–99)
POTASSIUM SERPL-SCNC: 5.2 MMOL/L (ref 3.5–5.2)
SODIUM SERPL-SCNC: 138 MMOL/L (ref 134–144)

## 2025-02-17 ENCOUNTER — TELEPHONE (OUTPATIENT)
Dept: FAMILY MEDICINE CLINIC | Facility: CLINIC | Age: 60
End: 2025-02-17
Payer: COMMERCIAL

## 2025-02-17 DIAGNOSIS — N18.32 CHRONIC RENAL FAILURE, STAGE 3B: Primary | ICD-10-CM

## 2025-02-17 NOTE — TELEPHONE ENCOUNTER
----- Message from Ulises Maldonado sent at 2/17/2025  8:06 AM EST -----  Please speak the patient regarding follow-up BMP as obtained 2/14/2025.  This was in regards to reassessing his kidney function.  Notable results as follows:    Glucose normal at 99.  Kidney function continues elevated with creatinine 1.72, GFR 45, slightly improved compared to 11/15/2024 with creatinine 1.87 and GFR 41, prior to that 5/17/2024 with creatinine 1.60, GFR 49 and 2/17/2023 with creatinine 1.43, GFR 57.  Otherwise normal electrolytes.    Overall this pattern still shows a notable increase over the last year, which has still stabilized.  He did furthermore have a recent bilateral kidney ultrasound which was reassuring.  Nonetheless with this notable increase of the last year I would like to go ahead and refer him to nephrology, which I discussed with him at his recent visit.  If he is agreeable please advise this to me, and I will refer as appropriate.

## 2025-02-17 NOTE — TELEPHONE ENCOUNTER
Patient is traveling he would like for us to tell his girlfriend she is on the release form waiting on callback.

## 2025-04-10 DIAGNOSIS — I10 ESSENTIAL HYPERTENSION: ICD-10-CM

## 2025-04-10 DIAGNOSIS — R79.89 LOW TESTOSTERONE IN MALE: ICD-10-CM

## 2025-04-10 RX ORDER — LOSARTAN POTASSIUM AND HYDROCHLOROTHIAZIDE 25; 100 MG/1; MG/1
1 TABLET ORAL DAILY
Qty: 90 TABLET | Refills: 1 | Status: SHIPPED | OUTPATIENT
Start: 2025-04-10

## 2025-04-10 RX ORDER — TESTOSTERONE 20.25 MG/1.25G
GEL TOPICAL
Qty: 150 G | Refills: 0 | OUTPATIENT
Start: 2025-04-10

## 2025-04-10 RX ORDER — TESTOSTERONE 20.25 MG/1.25G
GEL TOPICAL
Qty: 150 G | Refills: 0 | Status: SHIPPED | OUTPATIENT
Start: 2025-04-10

## 2025-06-13 ENCOUNTER — OFFICE VISIT (OUTPATIENT)
Dept: FAMILY MEDICINE CLINIC | Facility: CLINIC | Age: 60
End: 2025-06-13
Payer: COMMERCIAL

## 2025-06-13 VITALS
SYSTOLIC BLOOD PRESSURE: 126 MMHG | BODY MASS INDEX: 36.37 KG/M2 | DIASTOLIC BLOOD PRESSURE: 78 MMHG | OXYGEN SATURATION: 96 % | TEMPERATURE: 97.7 F | HEIGHT: 68 IN | WEIGHT: 240 LBS | HEART RATE: 85 BPM

## 2025-06-13 DIAGNOSIS — Z00.00 ROUTINE GENERAL MEDICAL EXAMINATION AT A HEALTH CARE FACILITY: Primary | ICD-10-CM

## 2025-06-13 DIAGNOSIS — E66.09 CLASS 2 OBESITY DUE TO EXCESS CALORIES WITHOUT SERIOUS COMORBIDITY WITH BODY MASS INDEX (BMI) OF 37.0 TO 37.9 IN ADULT: ICD-10-CM

## 2025-06-13 DIAGNOSIS — J45.20 MILD INTERMITTENT INTRINSIC ASTHMA WITHOUT STATUS ASTHMATICUS WITHOUT COMPLICATION: ICD-10-CM

## 2025-06-13 DIAGNOSIS — J30.1 SEASONAL ALLERGIC RHINITIS DUE TO POLLEN: ICD-10-CM

## 2025-06-13 DIAGNOSIS — Z12.5 SPECIAL SCREENING, PROSTATE CANCER: ICD-10-CM

## 2025-06-13 DIAGNOSIS — R73.03 PREDIABETES: ICD-10-CM

## 2025-06-13 DIAGNOSIS — M54.2 CERVICALGIA: ICD-10-CM

## 2025-06-13 DIAGNOSIS — R79.89 LOW TESTOSTERONE IN MALE: ICD-10-CM

## 2025-06-13 DIAGNOSIS — F17.210 CIGARETTE SMOKER: ICD-10-CM

## 2025-06-13 DIAGNOSIS — E78.2 MIXED HYPERLIPIDEMIA: ICD-10-CM

## 2025-06-13 DIAGNOSIS — N18.32 CHRONIC RENAL FAILURE, STAGE 3B: ICD-10-CM

## 2025-06-13 DIAGNOSIS — G47.10 HYPERSOMNIA: ICD-10-CM

## 2025-06-13 DIAGNOSIS — I10 ESSENTIAL HYPERTENSION: ICD-10-CM

## 2025-06-13 DIAGNOSIS — R80.8 OTHER PROTEINURIA: ICD-10-CM

## 2025-06-13 DIAGNOSIS — E66.812 CLASS 2 OBESITY DUE TO EXCESS CALORIES WITHOUT SERIOUS COMORBIDITY WITH BODY MASS INDEX (BMI) OF 37.0 TO 37.9 IN ADULT: ICD-10-CM

## 2025-06-13 DIAGNOSIS — N52.9 VASCULOGENIC ERECTILE DYSFUNCTION, UNSPECIFIED VASCULOGENIC ERECTILE DYSFUNCTION TYPE: ICD-10-CM

## 2025-06-13 PROBLEM — J45.909 INTRINSIC ASTHMA WITHOUT STATUS ASTHMATICUS WITHOUT COMPLICATION: Status: ACTIVE | Noted: 2024-12-31

## 2025-06-13 NOTE — ASSESSMENT & PLAN NOTE
Has no pattern of allergies with good response to Zyrtec and Flonase, currently using some modest spring triggers.  If notable breakthrough symptoms future we could consider adding montelukast to her regimen.  Additional benefit of saline spray, nasal flushing.  Advise concerns.

## 2025-06-13 NOTE — ASSESSMENT & PLAN NOTE
Felt in part related to low testosterone level as discussed initially 3/11/2022. Clinically improved with replacement, if recurrent or persistent in the future we could consider adding sildenafil or comparable medication.  Still having good symptom improvement as 6/13/2025.  Advise new concerns.

## 2025-06-13 NOTE — PROGRESS NOTES
Male Physical Note      Date: 2025   Patient Name: Isai Bush  : 1965   MRN: [id number]     Chief Complaint   Patient presents with    Annual Exam       History of Present Illness: Isai Bush is a 59 y.o. male who is here today for their annual health maintenance and physical.  Additional complete physical is also concern follow-up visit and discussion of multiple other medical problems concerns.  Regarding blood pressure when he checks at home he describes it being typically in the low to mid 120s over 70s with no lightheadedness or dizziness.  Ongoing smoking 3 pack/day of cigarettes, discussed benefits of cessation in medicine such as Chantix and nicotine patches for cessation he is not quite ready but will advise if he wants to pursue in the future.  With his low-dose CT of the chest which is due he would like to switch it over to [facility] for convenience.  With hyperlipidemia and weight pattern he is making efforts to eat healthier and be a little bit more active he has had 7 pound weight loss and will make efforts to improve further.  With his low testosterone level and associated erectile dysfunction he continues to do well on testosterone replacement feeling better, good sexual interest, good energy level and generally pleased with how he is doing in that regard.  With prediabetic pattern similar to his cholesterol and weight pattern he is making actually healthy and be active and is having no polyuria polydipsia.  With some history of concern of hypersomnia symptoms he feels he is doing better in that regard and declines desire for evaluation.  With chronic renal failure pattern and with persisting elevations at his visit 2024 he was referred to nephrology who repeated some labs on 4/10/2025 and has planned 4-month follow-up visit but otherwise did not make any medication changes.    Subjective      Review of Systems    Past Medical History, Social  History, Family History and Care Team were all reviewed with patient and updated as appropriate.       Current Outpatient Medications:     albuterol sulfate  (90 Base) MCG/ACT inhaler, Inhale 2 puffs Every 4 (Four) Hours As Needed for Wheezing or Shortness of Air., Disp: 18 g, Rfl: 2    cetirizine (zyrTEC) 10 MG tablet, Take 1 tablet by mouth Daily., Disp: 30 tablet, Rfl: 3    fluticasone (FLONASE) 50 MCG/ACT nasal spray, 2 sprays into the nostril(s) as directed by provider Daily., Disp: 15.8 mL, Rfl: 3    losartan-hydrochlorothiazide (HYZAAR) 100-25 MG per tablet, Take 1 tablet by mouth Daily., Disp: 90 tablet, Rfl: 1    naproxen (NAPROSYN) 375 MG tablet, Take 1 tablet by mouth 2 (Two) Times a Day As Needed for Mild Pain., Disp: 60 tablet, Rfl: 1    Spacer/Aero-Holding Chambers (AeroChamber MV) inhaler, Use as instructed, Disp: 1 each, Rfl: 0    Testosterone 1.62 % gel, Apply 4 pumps topically to shoulders and upper arms once a day., Disp: 150 g, Rfl: 0    No Known Allergies    Immunization History   Administered Date(s) Administered    COVID-19 (TRACE) 08/06/2021    COVID-19 (UNSPECIFIED) 08/06/2021    Fluzone (or Fluarix & Flulaval for VFC) >6mos 10/24/2020    Tdap 02/17/2023        Health Maintenance Summary            Current Care Gaps       COVID-19 Vaccine (3 - 2024-25 season) Overdue since 9/1/2024      11/15/2024  Postponed until 11/17/2024 by Ulises Maldonado MD (Patient Refused)    08/06/2021  Imm Admin: COVID-19 (TRACE)    08/06/2021  Imm Admin: COVID-19 (UNSPECIFIED)              ANNUAL PHYSICAL (Yearly) Overdue since 5/17/2025 05/17/2024  Registry Metric: Last Annual Physical              Pneumococcal Vaccine 50+ (1 of 2 - PCV) Never done     No completion, postpone, or frequency change history exists for this topic.              ZOSTER VACCINE (1 of 2) Never done     No completion, postpone, or frequency change history exists for this topic.                      Needs Review        COLORECTAL CANCER SCREENING (COLONOSCOPY - Every 10 Years) Tentatively due on 2021  Colonoscopy                      Awaiting Completion       LIPID PANEL (Yearly) Order placed this encounter      2025  Order placed for Lipid Panel by Ulises Maldonado MD    2024  Lipid Panel    2023  Lipid Panel              LUNG CANCER SCREENING (Yearly) Order placed this encounter      2025  Order placed for  CT Chest Low Dose Cancer Screening WO by Ulises Maldonado MD    2024   CT Chest Low Dose Cancer Screening WO    2023   CT Chest Low Dose Cancer Screening WO    10/22/2020  CT CHEST W CONTRAST DIAGNOSTIC                      Upcoming       INFLUENZA VACCINE (Yearly - July to March) Next due on 2025      11/15/2024  Postponed until 3/31/2025 by Ulises Maldonado MD (Patient Refused)    2023  Postponed until 3/31/2024 by Rozina Whitaker (Patient Refused)    10/24/2020  Imm Admin: Fluzone (or Fluarix & Flulaval for VFC) >6mos              TDAP/TD VACCINES (2 - Td or Tdap) Next due on 2023  Imm Admin: Tdap                      Completed or No Longer Recommended       HEPATITIS C SCREENING  Completed      2023  Hep C Virus Ab component of Hepatitis C Antibody                            Colorectal Screenin2025 to Dr. De Leon with 5-year follow-up recommended   Last Completed Colonoscopy            Needs Review       COLORECTAL CANCER SCREENING (COLONOSCOPY - Every 10 Years) Tentatively due on 2021  Colonoscopy                          CT for Smoker (Age 50-80, 20pk yr within last 15 years): 2024 reassuring with repeat ordered 2025  Bone Density/DEXA (high risk): Not applicable.  Hep C (Age 18-79 once): Negative on 2023  HIV (Age 15-65 once) : Negative on 2023  PSA (Over age 50, C Level Recommendation):   Lab Results   Component Value Date    PSA 0.3 11/15/2024    PSA 0.3 2024    PSA 0.4 2023  "    US Aorta (For male smokers, age 65): Not applicable  A1c:   Hemoglobin A1C   Date Value Ref Range Status   02/14/2025 6.0 (A) 4.5 - 5.7 % Final     Lipid panel: No results found for: \"LIPIDEXCLUSI\"    The 10-year ASCVD risk score (Alejandra RUBALCAVA, et al., 2019) is: 22.7%    Values used to calculate the score:      Age: 59 years      Sex: Male      Is Non- : No      Diabetic: No      Tobacco smoker: Yes      Systolic Blood Pressure: 126 mmHg      Is BP treated: Yes      HDL Cholesterol: 33 mg/dL      Total Cholesterol: 227 mg/dL      Tobacco Use: High Risk (6/13/2025)    Patient History     Smoking Tobacco Use: Every Day     Smokeless Tobacco Use: Never     Passive Exposure: Not on file       Social History     Substance and Sexual Activity   Alcohol Use Not Currently    Alcohol/week: 2.0 standard drinks of alcohol    Types: 2 Cans of beer per week    Comment: Rare social alcohol, once or twice monthly, typically beer        Social History     Substance and Sexual Activity   Drug Use Not Currently        Diet/Physical activity: Ongoing attempts to improve dietary intake and activity level for which he feels he is done a little bit better with diet will continue make efforts to improve    Sexual health:  Social History     Substance and Sexual Activity   Sexual Activity Yes       PHQ-2 Depression Screening  PHQ-9 Depression Screening  Little interest or pleasure in doing things? Not at all   Feeling down, depressed, or hopeless? Not at all   PHQ-2 Total Score 0   Trouble falling or staying asleep, or sleeping too much?     Feeling tired or having little energy?     Poor appetite or overeating?     Feeling bad about yourself - or that you are a failure or have let yourself or your family down?     Trouble concentrating on things, such as reading the newspaper or watching television?     Moving or speaking so slowly that other people could have noticed? Or the opposite - being so fidgety or " "restless that you have been moving around a lot more than usual?     Thoughts that you would be better off dead, or of hurting yourself in some way?     PHQ-9 Total Score     If you checked off any problems, how difficult have these problems made it for you to do your work, take care of things at home, or get along with other people? Not difficult at all         Intimate partner violence: (Screen on initial visit, older adult with injury or evidence of neglect):  Violence can be a problem in many people's lives, so I now ask every patient about trauma or abuse they may have experienced in a relationship.  Stress/Safety - Do you feel safe in your relationship?  Afraid/Abused - Have you ever been in a relationship where you were threatened, hurt, or afraid?  Friend/Family - Are your friends aware you have been hurt?  Emergency Plan - Do you have a safe place to go and the resources you need in an emergency?    Osteoporosis:   Men: history of low trauma fracture, androgen deprivation therapy for prostate cancer, hypogonadism, primary hyperparathyroidism, intestinal disorders.     Objective     Physical Exam:  Vitals:    06/13/25 1005   BP: 126/78   BP Location: Right arm   Patient Position: Sitting   Cuff Size: Adult   Pulse: 85   Temp: 97.7 °F (36.5 °C)   TempSrc: Temporal   SpO2: 96%   Weight: 109 kg (240 lb)   Height: 171.5 cm (67.5\")     Body mass index is 37.03 kg/m².     Physical Exam  Constitutional:       General: He is not in acute distress.     Appearance: Normal appearance. He is not ill-appearing, toxic-appearing or diaphoretic.   HENT:      Head: Normocephalic and atraumatic.      Right Ear: Ear canal and external ear normal.      Left Ear: Ear canal and external ear normal.      Ears:      Comments: Mild fluid behind the TMs bilaterally, otherwise clear     Nose: Rhinorrhea present.      Comments: Mild clear rhinorrhea     Mouth/Throat:      Mouth: Mucous membranes are moist.      Pharynx: Oropharynx is " clear. No oropharyngeal exudate or posterior oropharyngeal erythema.      Comments: Mild chronic mucositis consistent with smoking history but otherwise clear  Eyes:      Extraocular Movements: Extraocular movements intact.      Conjunctiva/sclera: Conjunctivae normal.      Pupils: Pupils are equal, round, and reactive to light.   Neck:      Vascular: No carotid bruit.      Comments: No thyroid enlargement, no thyroid nodules  Cardiovascular:      Rate and Rhythm: Normal rate and regular rhythm.      Pulses: Normal pulses.      Heart sounds: Normal heart sounds. No murmur heard.     No friction rub. No gallop.   Pulmonary:      Effort: Pulmonary effort is normal. No respiratory distress.      Breath sounds: Normal breath sounds. No stridor. No wheezing.   Abdominal:      General: Abdomen is flat. Bowel sounds are normal. There is no distension.      Palpations: Abdomen is soft. There is no mass.      Tenderness: There is no abdominal tenderness. There is no guarding or rebound.      Hernia: No hernia is present.   Genitourinary:     Comments: Patient declines  exam  Musculoskeletal:      Cervical back: Neck supple. No tenderness.      Right lower leg: No edema.      Left lower leg: No edema.   Lymphadenopathy:      Cervical: No cervical adenopathy.   Skin:     General: Skin is warm and dry.      Capillary Refill: Capillary refill takes less than 2 seconds.      Findings: No rash.   Neurological:      General: No focal deficit present.      Mental Status: He is alert and oriented to person, place, and time. Mental status is at baseline.      Motor: No weakness.      Gait: Gait normal.   Psychiatric:         Mood and Affect: Mood normal.         Behavior: Behavior normal.         Thought Content: Thought content normal.         Judgment: Judgment normal.           ECG 12 Lead    Date/Time: 6/13/2025 11:29 AM  Performed by: Ulises Maldonado MD    Authorized by: Ulises Maldonado MD  Comparison: compared with previous ECG from  5/17/2024  Similar to previous ECG  Rhythm: sinus rhythm  Rate: normal  Conduction: right bundle branch block  ST Segments: ST segments normal  T Waves: T waves normal  QRS axis: normal  Other: no other findings    Clinical impression: abnormal EKG  Comments: Abnormal EKG with right bundle branch block but unchanged compared to previous as an isolated and stable finding, this is nonconcerning            Patient's (Body mass index is 37.03 kg/m².) indicates that they are obese (BMI >30) with health related conditions that include hypertension, impaired fasting glucose, and dyslipidemias . Weight is improving with lifestyle modifications. BMI is is above average; BMI management plan is completed. We discussed low calorie, low carb based diet program, portion control, increasing exercise, and joining a fitness center or start home based exercise program.     Assessment / Plan      Assessment/Plan:   Diagnoses and all orders for this visit:    1. Routine general medical examination at a health care facility (Primary)  Assessment & Plan:  Last screening blood work 5/17/2024, including also 2/17/2023 including negative HIV and hepatitis C virus screening.  Recheck blood work 6/13/2025 with management per results.  Tdap booster given 2/17/2023.  Recommend pneumococcal 21 Valent vaccine, currently declines question insurance cover, he might obtain through pharmacy.  Colonoscopy 4/14/2021 by Dr. De Leon including multiple small diverticula, internal hemorrhoids noted.  Multiple small hyperplastic appearing polyps in the rectum, in the transverse colon base less than 5 mm sessile polyp, transverse colon a 5 mm sessile polyp with no high-grade dysplasia on biopsy.  Five-year followup recommended.     Orders:  -     CBC & Differential; Future  -     Comprehensive Metabolic Panel; Future  -     UA / M With / Rflx Culture([facility] ONLY) - Urine, Clean Catch  -     Lipid Panel; Future  -     TSH Rfx On Abnormal To Free T4;  Future  -     TSH Rfx On Abnormal To Free T4  -     Lipid Panel  -     Comprehensive Metabolic Panel  -     CBC & Differential    2. Prediabetes  Assessment & Plan:  Diagnoses with hemoglobin A1c 5.7 on 6/17/2022.  As were out of point-of-care testing I will check hemoglobin A1c at this venous blood work 6/13/2025, managed per results.  Previous 6.0% on 2/14/2025 after increased to 6.0% on 11/15/2024, prior 5.5%, 5.9%, 5.9% on 2/17/2023, prior to that at diagnosis 5.7% on 6/17/2022.  Fairly notable increase in to mid prediabetic range, reinforced importance of healthy diet, exercise and weight loss.  Caution polyuria and polydipsia sign of progression of diabetes.  As of visit 6/13/2025 and regarding hemoglobin A1c and control, if he has good stability we will plan to follow-up in 6 months, if increasing we would move that up to 3 to 4-month window.    Orders:  -     Hemoglobin A1c; Future  -     Hemoglobin A1c    3. Essential hypertension  Assessment & Plan:  Diagnosis 3/9/2021. EKG 6/13/2025 with sinus rhythm, with isolated and non-concerning isolated right bundle branch block, unchanged to 5/17/2024, 3/9/2021.  Continue good blood pressure control, continue losartan/HCTZ 100/25 mg, increased from previous dose of 50/12.5 on 3/11/2022.  Good results in clinic, and he has been checking a bit more often at home and runs in the same range in the low to mid 120s over 70s.  Continue healthy diet, exercise, weight loss, decrease salt intake, et cetera.  Elevations of blood pressure in the future.    Orders:  -     ECG 12 Lead    4. Chronic renal failure, stage 3b  Assessment & Plan:  Most recent blood work with 2/14/2025 creatinine 1.72 and GFR 45, compared to 11/15/2024 with creatinine increased 1.87, GFR 41, 5/17/2024 with creatinine 1.60, GFR 49 compared to 1.43, 57 the year prior.  Patient with hypertension, prediabetes, likely etiology.  With this pattern I had obtained bilateral ultrasound of the kidneys which  were nonrevealing and normal on 1/17/2025 at [facility].  With persisting elevation as of 2/14/2025 blood work, referral to nephrology who we saw 4/10/2025 he was sent some repeat lab work and ultimately is monitoring with planned follow-up August 2025.  Reinforced importance of keeping those follow-up visits.  Keep good hydration and caution renal toxic medication.      5. Other proteinuria  Assessment & Plan:  1+ proteinuria with 7/23/2021, blood work, cleared on recheck urinalysis in clinic 3/11/2022, and again cleared with blood work 5/17/2024.  Monitor with blood work yearly, recheck pending with 6/13/2025 laboratory investigations.      6. Low testosterone in male  Assessment & Plan:  Most recent total testosterone 393 on 11/15/2024 with additional reassuring PSA, previous 381 on 5/17/2024, previous 538 on 2/17/2023 after increasing from 2 pumps daily up to 4 pumps daily of the AndroGel 1.60%, 6/25/2022 surprisingly slightly increased to 229, comparison 3/26/2022 243, and 3/19/2022 at 248.  Of note, previous testing on 6/25/2022 related to testosterone administration includes CBC with differential within normal limits, CMP with notable normal liver function test, although creatinine was a little bit increased 1.5, compared to normal previous 1.3.  Also, PSA normal 0.30 with comparison 0.2 on 3/19/2022.  Clinically patient is seen benefit of testosterone replacement.  Included in screening blood work today 6/13/2025 we will check total testosterone, CBC to monitor blood counts, CMP for liver function test, PSA. In general he should have monitoring every 6 months at least of total testosterone, and additionally yearly CBC and CMP with PSA, now that he has stability.  Continue previously good range of dosing of 4 pumps per day of AndroGel 1.62%.  Advise concerns.    Orders:  -     Testosterone; Future  -     PSA Screen; Future  -     PSA Screen  -     Testosterone    7. Vasculogenic erectile  dysfunction, unspecified vasculogenic erectile dysfunction type  Assessment & Plan:  Felt in part related to low testosterone level as discussed initially 3/11/2022. Clinically improved with replacement, if recurrent or persistent in the future we could consider adding sildenafil or comparable medication.  Still having good symptom improvement as 6/13/2025.  Advise new concerns.      8. Cigarette smoker  Assessment & Plan:  Ongoing at 3/4-1 pack/day smoking.  Historically One pack per day for 30 years,.  He is not ready to pursue cessation but he understands risks.  Advise desire to pursue cessation with medicine.  Low-dose CT of the chest negative 5/17/2024, prior to that  3/3/2023.  Order for low-dose CT the chest to be obtained through [facility] as of 6/13/2025 visit.  Patient declines being ready to pursue cessation but if he changes his mind I will be willing to prescribe Chantix or nicotine patches as desired.    Orders:  -     Cancel:  CT Chest Low Dose Cancer Screening WO; Future  -     Cancel:  CT Chest Low Dose Cancer Screening WO; Future  -      CT Chest Low Dose Cancer Screening WO; Future    9. Mixed hyperlipidemia  Assessment & Plan:  5/17/2024 total cholesterol 227, triglycerides 268, HDL 33, .  Comparison 2/17/2023 total cholesterol 204, triglycerides 295, HDL 30, , 7/23/2021 total cholesterol 198, triglycerides 226, HDL 28, .  Not taking any medicine at this time for cholesterol and not quite to the threshold to initiate despite modest worsening.  Modest dyslipidemia pattern, reinforced importance of healthy diet, exercise and benefits of weight loss.  Monitor yearly but if were to increase further in the future we would then have to consider statin therapy.  Recheck of cholesterol with blood work 6/13/2025, management per results.      10. Class 2 obesity due to excess calories without serious comorbidity with body mass index (BMI) of 37.0 to 37.9 in  adult  Assessment & Plan:  BMI in the mid 30s range, reinforced importance of healthy diet, exercise and weight loss for multiple medical comorbidities.       11. Cervicalgia  Assessment & Plan:  Involving the paraspinal muscles more so than cervical spinous process region and lower cervical levels, periodically flaring modestly but never concerning neurologic manifestations.  Plan at 5/17/2024 for x-rays cervical spine but is seem to be better and he will continue not pursuing he is doing better at this time.  Advising worsening we will repeat consider x-ray imaging of the cervical spine and possibly physical therapy.    Naproxen 375 mg twice daily as needed, using short burst.  Heating pad, light stretching.  No new concerns as of 6/13/2025.      12. Hypersomnia  Assessment & Plan:  Discussed 5/7/2024 with component of regular snoring, sometimes breath-holding and potentially variable pattern of daytime fatigue and sleepiness. referral on 5/7/2024 for sleep evaluation but since that time the patient does not feel that actually snoring that much or breath-holding and declines evaluation although I have reinforced the importance of he is having the symptoms to be assessed as treatment could be significant beneficial for symptoms and for cardiovascular risk benefit.  He understands.  With no symptoms concern from the patient declines further evaluation, advised if he were to change his mind.      13. Mild intermittent intrinsic asthma without status asthmaticus without complication  Assessment & Plan:  Mild intermittent asthmatic pattern which intermittently flares but not frequently, doing well currently.  I also discussed how ongoing smoking can be an exacerbating factor.  Albuterol inhaler with spacer 2 puffs every 4-6 hours the next few days, then as needed.  Advise new concerns.      14. Seasonal allergic rhinitis due to pollen  Assessment & Plan:  Has no pattern of allergies with good response to Zyrtec and  Flonase, currently using some modest spring triggers.  If notable breakthrough symptoms future we could consider adding montelukast to her regimen.  Additional benefit of saline spray, nasal flushing.  Advise concerns.      15. Special screening, prostate cancer  Assessment & Plan:  Monitor with blood work especially in context of testosterone replacement therapy.    Orders:  -     PSA Screen; Future  -     PSA Screen         Vaccine Counseling:      Healthcare Maintenance:  Counseling provided based on age appropriate USPSTF guidelines.       Smoking Cessation:   3-10 mintues spent counseling Will try to cut down    Isai Bush voices understanding and acceptance of this advice and will call back with any further questions or concerns. AVS with preventive healthcare tips printed for patient.     Follow Up:   Return in about 6 months (around 12/13/2025) for Next scheduled follow up.        Ulises Maldonado MD  [facility] [place]

## 2025-06-13 NOTE — ASSESSMENT & PLAN NOTE
Diagnoses with hemoglobin A1c 5.7 on 6/17/2022.  As were out of point-of-care testing I will check hemoglobin A1c at this venous blood work 6/13/2025, managed per results.  Previous 6.0% on 2/14/2025 after increased to 6.0% on 11/15/2024, prior 5.5%, 5.9%, 5.9% on 2/17/2023, prior to that at diagnosis 5.7% on 6/17/2022.  Fairly notable increase in to mid prediabetic range, reinforced importance of healthy diet, exercise and weight loss.  Caution polyuria and polydipsia sign of progression of diabetes.  As of visit 6/13/2025 and regarding hemoglobin A1c and control, if he has good stability we will plan to follow-up in 6 months, if increasing we would move that up to 3 to 4-month window.

## 2025-06-13 NOTE — PROGRESS NOTES
Venipuncture Blood Specimen Collection  Venipuncture performed in left arm  by Tiffanie Wolff MA with good hemostasis. Patient tolerated the procedure well without complications.   06/13/25   Tiffanie Wolff MA

## 2025-06-13 NOTE — ASSESSMENT & PLAN NOTE
Most recent total testosterone 393 on 11/15/2024 with additional reassuring PSA, previous 381 on 5/17/2024, previous 538 on 2/17/2023 after increasing from 2 pumps daily up to 4 pumps daily of the AndroGel 1.60%, 6/25/2022 surprisingly slightly increased to 229, comparison 3/26/2022 243, and 3/19/2022 at 248.  Of note, previous testing on 6/25/2022 related to testosterone administration includes CBC with differential within normal limits, CMP with notable normal liver function test, although creatinine was a little bit increased 1.5, compared to normal previous 1.3.  Also, PSA normal 0.30 with comparison 0.2 on 3/19/2022.  Clinically patient is seen benefit of testosterone replacement.  Included in screening blood work today 6/13/2025 we will check total testosterone, CBC to monitor blood counts, CMP for liver function test, PSA. In general he should have monitoring every 6 months at least of total testosterone, and additionally yearly CBC and CMP with PSA, now that he has stability.  Continue previously good range of dosing of 4 pumps per day of AndroGel 1.62%.  Advise concerns.

## 2025-06-13 NOTE — ASSESSMENT & PLAN NOTE
Ongoing at 3/4-1 pack/day smoking.  Historically One pack per day for 30 years,.  He is not ready to pursue cessation but he understands risks.  Advise desire to pursue cessation with medicine.  Low-dose CT of the chest negative 5/17/2024, prior to that  3/3/2023.  Order for low-dose CT the chest to be obtained through [facility] as of 6/13/2025 visit.  Patient declines being ready to pursue cessation but if he changes his mind I will be willing to prescribe Chantix or nicotine patches as desired.

## 2025-06-13 NOTE — ASSESSMENT & PLAN NOTE
Involving the paraspinal muscles more so than cervical spinous process region and lower cervical levels, periodically flaring modestly but never concerning neurologic manifestations.  Plan at 5/17/2024 for x-rays cervical spine but is seem to be better and he will continue not pursuing he is doing better at this time.  Advising worsening we will repeat consider x-ray imaging of the cervical spine and possibly physical therapy.    Naproxen 375 mg twice daily as needed, using short burst.  Heating pad, light stretching.  No new concerns as of 6/13/2025.

## 2025-06-13 NOTE — ASSESSMENT & PLAN NOTE
5/17/2024 total cholesterol 227, triglycerides 268, HDL 33, .  Comparison 2/17/2023 total cholesterol 204, triglycerides 295, HDL 30, , 7/23/2021 total cholesterol 198, triglycerides 226, HDL 28, .  Not taking any medicine at this time for cholesterol and not quite to the threshold to initiate despite modest worsening.  Modest dyslipidemia pattern, reinforced importance of healthy diet, exercise and benefits of weight loss.  Monitor yearly but if were to increase further in the future we would then have to consider statin therapy.  Recheck of cholesterol with blood work 6/13/2025, management per results.

## 2025-06-13 NOTE — ASSESSMENT & PLAN NOTE
1+ proteinuria with 7/23/2021, blood work, cleared on recheck urinalysis in clinic 3/11/2022, and again cleared with blood work 5/17/2024.  Monitor with blood work yearly, recheck pending with 6/13/2025 laboratory investigations.

## 2025-06-13 NOTE — ASSESSMENT & PLAN NOTE
Last screening blood work 5/17/2024, including also 2/17/2023 including negative HIV and hepatitis C virus screening.  Recheck blood work 6/13/2025 with management per results.  Tdap booster given 2/17/2023.  Recommend pneumococcal 21 Valent vaccine, currently declines question insurance cover, he might obtain through pharmacy.  Colonoscopy 4/14/2021 by Dr. De Leon including multiple small diverticula, internal hemorrhoids noted.  Multiple small hyperplastic appearing polyps in the rectum, in the transverse colon base less than 5 mm sessile polyp, transverse colon a 5 mm sessile polyp with no high-grade dysplasia on biopsy.  Five-year followup recommended.

## 2025-06-13 NOTE — ASSESSMENT & PLAN NOTE
Discussed 5/7/2024 with component of regular snoring, sometimes breath-holding and potentially variable pattern of daytime fatigue and sleepiness. referral on 5/7/2024 for sleep evaluation but since that time the patient does not feel that actually snoring that much or breath-holding and declines evaluation although I have reinforced the importance of he is having the symptoms to be assessed as treatment could be significant beneficial for symptoms and for cardiovascular risk benefit.  He understands.  With no symptoms concern from the patient declines further evaluation, advised if he were to change his mind.

## 2025-06-13 NOTE — ASSESSMENT & PLAN NOTE
Mild intermittent asthmatic pattern which intermittently flares but not frequently, doing well currently.  I also discussed how ongoing smoking can be an exacerbating factor.  Albuterol inhaler with spacer 2 puffs every 4-6 hours the next few days, then as needed.  Advise new concerns.

## 2025-06-13 NOTE — PATIENT INSTRUCTIONS

## 2025-06-13 NOTE — ASSESSMENT & PLAN NOTE
Diagnosis 3/9/2021. EKG 6/13/2025 with sinus rhythm, with isolated and non-concerning isolated right bundle branch block, unchanged to 5/17/2024, 3/9/2021.  Continue good blood pressure control, continue losartan/HCTZ 100/25 mg, increased from previous dose of 50/12.5 on 3/11/2022.  Good results in clinic, and he has been checking a bit more often at home and runs in the same range in the low to mid 120s over 70s.  Continue healthy diet, exercise, weight loss, decrease salt intake, et cetera.  Elevations of blood pressure in the future.

## 2025-06-13 NOTE — ASSESSMENT & PLAN NOTE
Most recent blood work with 2/14/2025 creatinine 1.72 and GFR 45, compared to 11/15/2024 with creatinine increased 1.87, GFR 41, 5/17/2024 with creatinine 1.60, GFR 49 compared to 1.43, 57 the year prior.  Patient with hypertension, prediabetes, likely etiology.  With this pattern I had obtained bilateral ultrasound of the kidneys which were nonrevealing and normal on 1/17/2025 at [facility].  With persisting elevation as of 2/14/2025 blood work, referral to nephrology who we saw 4/10/2025 he was sent some repeat lab work and ultimately is monitoring with planned follow-up August 2025.  Reinforced importance of keeping those follow-up visits.  Keep good hydration and caution renal toxic medication.

## 2025-06-14 LAB
ALBUMIN SERPL-MCNC: 4.3 G/DL (ref 3.8–4.9)
ALP SERPL-CCNC: 116 IU/L (ref 44–121)
ALT SERPL-CCNC: 20 IU/L (ref 0–44)
APPEARANCE UR: CLEAR
AST SERPL-CCNC: 18 IU/L (ref 0–40)
BACTERIA #/AREA URNS HPF: NORMAL /[HPF]
BASOPHILS # BLD AUTO: 0 X10E3/UL (ref 0–0.2)
BASOPHILS NFR BLD AUTO: 1 %
BILIRUB SERPL-MCNC: 0.8 MG/DL (ref 0–1.2)
BILIRUB UR QL STRIP: NEGATIVE
BUN SERPL-MCNC: 23 MG/DL (ref 8–27)
BUN/CREAT SERPL: 14 (ref 10–24)
CALCIUM SERPL-MCNC: 9.2 MG/DL (ref 8.6–10.2)
CASTS URNS QL MICRO: NORMAL /LPF
CHLORIDE SERPL-SCNC: 102 MMOL/L (ref 96–106)
CHOLEST SERPL-MCNC: 212 MG/DL (ref 100–199)
CO2 SERPL-SCNC: 19 MMOL/L (ref 20–29)
COLOR UR: YELLOW
CREAT SERPL-MCNC: 1.63 MG/DL (ref 0.76–1.27)
EGFRCR SERPLBLD CKD-EPI 2021: 48 ML/MIN/1.73
EOSINOPHIL # BLD AUTO: 0.2 X10E3/UL (ref 0–0.4)
EOSINOPHIL NFR BLD AUTO: 3 %
EPI CELLS #/AREA URNS HPF: NORMAL /HPF (ref 0–10)
ERYTHROCYTE [DISTWIDTH] IN BLOOD BY AUTOMATED COUNT: 14.1 % (ref 11.6–15.4)
GLOBULIN SER CALC-MCNC: 2.7 G/DL (ref 1.5–4.5)
GLUCOSE SERPL-MCNC: 89 MG/DL (ref 70–99)
GLUCOSE UR QL STRIP: NEGATIVE
HBA1C MFR BLD: 6.4 % (ref 4.8–5.6)
HCT VFR BLD AUTO: 55.3 % (ref 37.5–51)
HDLC SERPL-MCNC: 29 MG/DL
HGB BLD-MCNC: 17.6 G/DL (ref 13–17.7)
HGB UR QL STRIP: NEGATIVE
IMM GRANULOCYTES # BLD AUTO: 0 X10E3/UL (ref 0–0.1)
IMM GRANULOCYTES NFR BLD AUTO: 0 %
KETONES UR QL STRIP: NEGATIVE
LDLC SERPL CALC-MCNC: 129 MG/DL (ref 0–99)
LEUKOCYTE ESTERASE UR QL STRIP: NEGATIVE
LYMPHOCYTES # BLD AUTO: 2.3 X10E3/UL (ref 0.7–3.1)
LYMPHOCYTES NFR BLD AUTO: 35 %
MCH RBC QN AUTO: 28.6 PG (ref 26.6–33)
MCHC RBC AUTO-ENTMCNC: 31.8 G/DL (ref 31.5–35.7)
MCV RBC AUTO: 90 FL (ref 79–97)
MICRO URNS: NORMAL
MICRO URNS: NORMAL
MONOCYTES # BLD AUTO: 0.6 X10E3/UL (ref 0.1–0.9)
MONOCYTES NFR BLD AUTO: 9 %
NEUTROPHILS # BLD AUTO: 3.4 X10E3/UL (ref 1.4–7)
NEUTROPHILS NFR BLD AUTO: 52 %
NITRITE UR QL STRIP: NEGATIVE
PH UR STRIP: 5.5 [PH] (ref 5–7.5)
PLATELET # BLD AUTO: 227 X10E3/UL (ref 150–450)
POTASSIUM SERPL-SCNC: 5.1 MMOL/L (ref 3.5–5.2)
PROT SERPL-MCNC: 7 G/DL (ref 6–8.5)
PROT UR QL STRIP: NEGATIVE
PSA SERPL-MCNC: 0.3 NG/ML (ref 0–4)
RBC # BLD AUTO: 6.15 X10E6/UL (ref 4.14–5.8)
RBC #/AREA URNS HPF: NORMAL /HPF (ref 0–2)
SODIUM SERPL-SCNC: 136 MMOL/L (ref 134–144)
SP GR UR STRIP: 1.02 (ref 1–1.03)
TESTOST SERPL-MCNC: 770 NG/DL (ref 264–916)
TRIGL SERPL-MCNC: 302 MG/DL (ref 0–149)
TSH SERPL DL<=0.005 MIU/L-ACNC: 2.18 UIU/ML (ref 0.45–4.5)
URINALYSIS REFLEX: NORMAL
UROBILINOGEN UR STRIP-MCNC: 0.2 MG/DL (ref 0.2–1)
VLDLC SERPL CALC-MCNC: 54 MG/DL (ref 5–40)
WBC # BLD AUTO: 6.5 X10E3/UL (ref 3.4–10.8)
WBC #/AREA URNS HPF: NORMAL /HPF (ref 0–5)

## 2025-06-16 ENCOUNTER — RESULTS FOLLOW-UP (OUTPATIENT)
Dept: FAMILY MEDICINE CLINIC | Facility: CLINIC | Age: 60
End: 2025-06-16
Payer: COMMERCIAL

## 2025-06-16 DIAGNOSIS — E78.2 MIXED HYPERLIPIDEMIA: Primary | ICD-10-CM

## 2025-06-16 RX ORDER — ATORVASTATIN CALCIUM 40 MG/1
40 TABLET, FILM COATED ORAL DAILY
Qty: 90 TABLET | Refills: 1 | Status: SHIPPED | OUTPATIENT
Start: 2025-06-16

## 2025-06-16 NOTE — TELEPHONE ENCOUNTER
I have spoke to patient he had no questions will move appointment and recheck labs at 3 month follow up     Hub please move appointment from dec to sept.

## 2025-06-30 DIAGNOSIS — F17.210 CIGARETTE SMOKER: ICD-10-CM

## 2025-07-02 ENCOUNTER — TELEPHONE (OUTPATIENT)
Dept: FAMILY MEDICINE CLINIC | Facility: CLINIC | Age: 60
End: 2025-07-02
Payer: COMMERCIAL

## 2025-07-02 NOTE — TELEPHONE ENCOUNTER
Caller: ESTRADAJOVANNA    Relationship: Emergency Contact    Best call back number: [phone]     Caller requesting test results: CT SCAN    What test was performed: CT LUNG SCAN    When was the test performed: 6.27    Where was the test performed: [facility]     Additional notes: PLEASE CALL WITH RESULTS